# Patient Record
Sex: FEMALE | Race: BLACK OR AFRICAN AMERICAN | URBAN - METROPOLITAN AREA
[De-identification: names, ages, dates, MRNs, and addresses within clinical notes are randomized per-mention and may not be internally consistent; named-entity substitution may affect disease eponyms.]

---

## 2018-02-08 ENCOUNTER — EMERGENCY (EMERGENCY)
Facility: HOSPITAL | Age: 38
LOS: 1 days | Discharge: HOME | End: 2018-02-08
Admitting: EMERGENCY MEDICINE
Payer: MEDICARE

## 2018-02-08 VITALS
WEIGHT: 119.93 LBS | OXYGEN SATURATION: 100 % | SYSTOLIC BLOOD PRESSURE: 96 MMHG | RESPIRATION RATE: 16 BRPM | DIASTOLIC BLOOD PRESSURE: 67 MMHG | TEMPERATURE: 98 F | HEART RATE: 98 BPM

## 2018-02-08 DIAGNOSIS — N39.0 URINARY TRACT INFECTION, SITE NOT SPECIFIED: ICD-10-CM

## 2018-02-08 DIAGNOSIS — F17.200 NICOTINE DEPENDENCE, UNSPECIFIED, UNCOMPLICATED: ICD-10-CM

## 2018-02-08 DIAGNOSIS — R10.12 LEFT UPPER QUADRANT PAIN: ICD-10-CM

## 2018-02-08 DIAGNOSIS — E11.9 TYPE 2 DIABETES MELLITUS WITHOUT COMPLICATIONS: ICD-10-CM

## 2018-02-08 LAB
ALBUMIN SERPL ELPH-MCNC: 3.3 G/DL — LOW (ref 3.4–5)
ALBUMIN SERPL ELPH-MCNC: 3.4 G/DL — SIGNIFICANT CHANGE UP (ref 3.4–5)
ALP SERPL-CCNC: 85 U/L — SIGNIFICANT CHANGE UP (ref 40–120)
ALP SERPL-CCNC: 87 U/L — SIGNIFICANT CHANGE UP (ref 40–120)
ALT FLD-CCNC: 19 U/L — SIGNIFICANT CHANGE UP (ref 12–42)
ALT FLD-CCNC: 21 U/L — SIGNIFICANT CHANGE UP (ref 12–42)
ANION GAP SERPL CALC-SCNC: 10 MMOL/L — SIGNIFICANT CHANGE UP (ref 9–16)
ANION GAP SERPL CALC-SCNC: 9 MMOL/L — SIGNIFICANT CHANGE UP (ref 9–16)
APPEARANCE UR: CLEAR — SIGNIFICANT CHANGE UP
AST SERPL-CCNC: 21 U/L — SIGNIFICANT CHANGE UP (ref 15–37)
AST SERPL-CCNC: 27 U/L — SIGNIFICANT CHANGE UP (ref 15–37)
BILIRUB SERPL-MCNC: 0.1 MG/DL — LOW (ref 0.2–1.2)
BILIRUB SERPL-MCNC: 0.2 MG/DL — SIGNIFICANT CHANGE UP (ref 0.2–1.2)
BILIRUB UR-MCNC: NEGATIVE — SIGNIFICANT CHANGE UP
BUN SERPL-MCNC: 50 MG/DL — HIGH (ref 7–23)
BUN SERPL-MCNC: 50 MG/DL — HIGH (ref 7–23)
CALCIUM SERPL-MCNC: 9.4 MG/DL — SIGNIFICANT CHANGE UP (ref 8.5–10.5)
CALCIUM SERPL-MCNC: 9.4 MG/DL — SIGNIFICANT CHANGE UP (ref 8.5–10.5)
CHLORIDE SERPL-SCNC: 106 MMOL/L — SIGNIFICANT CHANGE UP (ref 96–108)
CHLORIDE SERPL-SCNC: 107 MMOL/L — SIGNIFICANT CHANGE UP (ref 96–108)
CO2 SERPL-SCNC: 24 MMOL/L — SIGNIFICANT CHANGE UP (ref 22–31)
CO2 SERPL-SCNC: 25 MMOL/L — SIGNIFICANT CHANGE UP (ref 22–31)
COLOR SPEC: YELLOW — SIGNIFICANT CHANGE UP
CREAT SERPL-MCNC: 1.68 MG/DL — HIGH (ref 0.5–1.3)
CREAT SERPL-MCNC: 1.7 MG/DL — HIGH (ref 0.5–1.3)
DIFF PNL FLD: NEGATIVE — SIGNIFICANT CHANGE UP
GLUCOSE SERPL-MCNC: 60 MG/DL — LOW (ref 70–99)
GLUCOSE SERPL-MCNC: 94 MG/DL — SIGNIFICANT CHANGE UP (ref 70–99)
GLUCOSE UR QL: NEGATIVE — SIGNIFICANT CHANGE UP
HCG UR QL: NEGATIVE — SIGNIFICANT CHANGE UP
HCT VFR BLD CALC: 31.9 % — LOW (ref 34.5–45)
HGB BLD-MCNC: 9.9 G/DL — LOW (ref 11.5–15.5)
KETONES UR-MCNC: NEGATIVE — SIGNIFICANT CHANGE UP
LEUKOCYTE ESTERASE UR-ACNC: (no result)
LIDOCAIN IGE QN: 114 U/L — SIGNIFICANT CHANGE UP (ref 73–393)
MCHC RBC-ENTMCNC: 22.1 PG — LOW (ref 27–34)
MCHC RBC-ENTMCNC: 31 G/DL — LOW (ref 32–36)
MCV RBC AUTO: 71.4 FL — LOW (ref 80–100)
NITRITE UR-MCNC: POSITIVE
PH UR: 7 — SIGNIFICANT CHANGE UP (ref 5–8)
PLATELET # BLD AUTO: 373 K/UL — SIGNIFICANT CHANGE UP (ref 150–400)
POTASSIUM SERPL-MCNC: 5.3 MMOL/L — SIGNIFICANT CHANGE UP (ref 3.5–5.3)
POTASSIUM SERPL-MCNC: 6 MMOL/L — HIGH (ref 3.5–5.3)
POTASSIUM SERPL-SCNC: 5.3 MMOL/L — SIGNIFICANT CHANGE UP (ref 3.5–5.3)
POTASSIUM SERPL-SCNC: 6 MMOL/L — HIGH (ref 3.5–5.3)
PROT SERPL-MCNC: 8.1 G/DL — SIGNIFICANT CHANGE UP (ref 6.4–8.2)
PROT SERPL-MCNC: 8.3 G/DL — HIGH (ref 6.4–8.2)
PROT UR-MCNC: NEGATIVE MG/DL — SIGNIFICANT CHANGE UP
RBC # BLD: 4.47 M/UL — SIGNIFICANT CHANGE UP (ref 3.8–5.2)
RBC # FLD: 15.6 % — SIGNIFICANT CHANGE UP (ref 10.3–16.9)
SODIUM SERPL-SCNC: 140 MMOL/L — SIGNIFICANT CHANGE UP (ref 132–145)
SODIUM SERPL-SCNC: 141 MMOL/L — SIGNIFICANT CHANGE UP (ref 132–145)
SP GR SPEC: 1.01 — SIGNIFICANT CHANGE UP (ref 1–1.03)
UROBILINOGEN FLD QL: 0.2 E.U./DL — SIGNIFICANT CHANGE UP
WBC # BLD: 6.4 K/UL — SIGNIFICANT CHANGE UP (ref 3.8–10.5)
WBC # FLD AUTO: 6.4 K/UL — SIGNIFICANT CHANGE UP (ref 3.8–10.5)

## 2018-02-08 PROCEDURE — 93010 ELECTROCARDIOGRAM REPORT: CPT

## 2018-02-08 PROCEDURE — 99284 EMERGENCY DEPT VISIT MOD MDM: CPT | Mod: 25

## 2018-02-08 RX ORDER — CEFUROXIME AXETIL 250 MG
1 TABLET ORAL
Qty: 13 | Refills: 0 | OUTPATIENT
Start: 2018-02-08 | End: 2018-02-14

## 2018-02-08 RX ORDER — METOCLOPRAMIDE HCL 10 MG
10 TABLET ORAL ONCE
Qty: 0 | Refills: 0 | Status: COMPLETED | OUTPATIENT
Start: 2018-02-08 | End: 2018-02-08

## 2018-02-08 RX ORDER — METOCLOPRAMIDE HCL 10 MG
10 TABLET ORAL ONCE
Qty: 0 | Refills: 0 | Status: DISCONTINUED | OUTPATIENT
Start: 2018-02-08 | End: 2018-02-08

## 2018-02-08 RX ORDER — SODIUM CHLORIDE 9 MG/ML
3 INJECTION INTRAMUSCULAR; INTRAVENOUS; SUBCUTANEOUS ONCE
Qty: 0 | Refills: 0 | Status: COMPLETED | OUTPATIENT
Start: 2018-02-08 | End: 2018-02-08

## 2018-02-08 RX ORDER — ONDANSETRON 8 MG/1
4 TABLET, FILM COATED ORAL ONCE
Qty: 0 | Refills: 0 | Status: DISCONTINUED | OUTPATIENT
Start: 2018-02-08 | End: 2018-02-08

## 2018-02-08 RX ORDER — SODIUM CHLORIDE 9 MG/ML
1000 INJECTION INTRAMUSCULAR; INTRAVENOUS; SUBCUTANEOUS ONCE
Qty: 0 | Refills: 0 | Status: COMPLETED | OUTPATIENT
Start: 2018-02-08 | End: 2018-02-08

## 2018-02-08 RX ORDER — CEFUROXIME AXETIL 250 MG
500 TABLET ORAL ONCE
Qty: 0 | Refills: 0 | Status: COMPLETED | OUTPATIENT
Start: 2018-02-08 | End: 2018-02-08

## 2018-02-08 RX ORDER — KETOROLAC TROMETHAMINE 30 MG/ML
30 SYRINGE (ML) INJECTION ONCE
Qty: 0 | Refills: 0 | Status: DISCONTINUED | OUTPATIENT
Start: 2018-02-08 | End: 2018-02-08

## 2018-02-08 RX ADMIN — Medication 500 MILLIGRAM(S): at 12:42

## 2018-02-08 RX ADMIN — Medication 10 MILLIGRAM(S): at 12:43

## 2018-02-08 RX ADMIN — SODIUM CHLORIDE 3 MILLILITER(S): 9 INJECTION INTRAMUSCULAR; INTRAVENOUS; SUBCUTANEOUS at 10:46

## 2018-02-08 RX ADMIN — SODIUM CHLORIDE 1000 MILLILITER(S): 9 INJECTION INTRAMUSCULAR; INTRAVENOUS; SUBCUTANEOUS at 12:42

## 2018-02-08 RX ADMIN — Medication 30 MILLIGRAM(S): at 12:42

## 2018-02-08 NOTE — ED PROVIDER NOTE - OBJECTIVE STATEMENT
36 y/o F with PMH of NIDDM, intranasal heroin use presents to ED c/o LUQ pain today with associated n/v.  Pt states she cannot tolerate PO.  She is currently in rehab but states she normally uses 10-13 bags of heroin per day.  Pt last used 1 bag yesterday.  She denies fevers/chills, blood in emesis, dysuria, hematuria, past abd surgeries.  Pt denies recent alcohol.      Of note, pt states she has not been getting Methadone or Suboxone due to insurance issues at rehab center?

## 2018-02-08 NOTE — SBIRT NOTE. - NSSBIRTSERVICES_GEN_A_ED_FT
Provided SBIRT services: Full screen positive. Referral to Treatment Performed. Screening results were  reviewed with the patient and patient was provided information about healthy guidelines and potential negative consequences associated with level of risk. Motivation and readiness to reduce or stop use was discussed and goals and activities to make changes were suggested/offered.    Referral for complete assessment and level of care determination at a certified treatment facility was  completed by contacting [facility] treatment facility via phone [phone]..  HC was informed that there are no beds available at this time.   Patient can walk in on her own from 9 am to 12pm any time with photo id and social security card to start the enrollment process.  [address] [facility] [id number]    Audit Score: 0  DAST Score: 7  Duration = # 20 Minutes

## 2018-02-08 NOTE — ED ADULT NURSE REASSESSMENT NOTE - NS ED NURSE REASSESS COMMENT FT1
patient refused toradol/reglan, "I don't want that shit get out of here. " Patient put on coat and exited room. NP aware.

## 2018-02-08 NOTE — ED PROVIDER NOTE - MEDICAL DECISION MAKING DETAILS
36 y/o F presents to ED c/o LUQ pain with associated n/v.  Pt well appearing.  Abd soft, non-tender, no CVAT.  Initial potassium of 6, no peaked twaves on EKG.  Repeat potassium 5.3.  Pt hydrated with IVFS, nausea treated.  Pt tolerating PO.  Pt requesting Methadone.  Symptoms likely associated with opiate withdrawal.  UA positive with nitrates and contaminated specimen.  Given lack of ability to reach pt, will treat conservatively with Ceftin Rx.  Pt discharged back to rehab facility with return precautions.

## 2018-02-13 ENCOUNTER — HOSPITAL ENCOUNTER (INPATIENT)
Dept: HOSPITAL 74 - YASAS | Age: 38
LOS: 2 days | Discharge: ANOTHER HOSPITAL | DRG: 897 | End: 2018-02-15
Attending: INTERNAL MEDICINE | Admitting: INTERNAL MEDICINE
Payer: COMMERCIAL

## 2018-02-13 VITALS — BODY MASS INDEX: 22.2 KG/M2

## 2018-02-13 DIAGNOSIS — F10.230: ICD-10-CM

## 2018-02-13 DIAGNOSIS — F19.24: ICD-10-CM

## 2018-02-13 DIAGNOSIS — F13.10: Primary | ICD-10-CM

## 2018-02-13 DIAGNOSIS — F17.210: ICD-10-CM

## 2018-02-13 DIAGNOSIS — F12.20: ICD-10-CM

## 2018-02-13 DIAGNOSIS — F14.20: ICD-10-CM

## 2018-02-13 PROCEDURE — HZ2ZZZZ DETOXIFICATION SERVICES FOR SUBSTANCE ABUSE TREATMENT: ICD-10-PCS | Performed by: INTERNAL MEDICINE

## 2018-02-13 NOTE — HP
CIWA Score





- CIWA Score


Nausea/Vomiting: 3


Muscle Tremors: 4-Moderate,w/Arms Extend


Anxiety: 4-Mod. Anxious/Guarded


Agitation: 4-Moderately Restless


Paroxysmal Sweats: 2


Orientation: 0-Oriented


Tacttile Disturbances: 0-None


Auditory Disturbances: 0-None


Visual Disturbances: 0-None


Headache: 0-None Present


CIWA-Ar Total Score: 17





Admission ROS S





- HPI


Chief Complaint: 





Alcohol withdrawal symptoms


Allergies/Adverse Reactions: 


 Allergies











Allergy/AdvReac Type Severity Reaction Status Date / Time


 


No Known Allergies Allergy   Verified 02/13/18 20:58














- Ebola screening


Have you traveled outside of the country in the last 21 days: No (N)


Have you had contact with anyone from an Ebola affected area: No


Have you been sick,other than usual withdrawal symptoms: No


Do you have a fever: No





Admission Physical Exam [facility]





- Vital Signs


Vital Signs: 


 Vital Signs - 24 hr











  02/13/18





  15:09


 


Temperature 97 F L


 


Pulse Rate 91 H


 


Respiratory 20





Rate 


 


Blood Pressure 111/67














BHS Breath Alcohol Content


Breath Alcohol Content: 0





Urine Pregancy Test





- Result


Urine Pregnancy Test Results: Negative- NO Line Present





Urine Drug Screen





- Results


Drug Screen Negative: No


Urine Drug Screen Results: PATIENCE-Cocaine, BZO-Benzodiazepines, MTD-Methadone

## 2018-02-13 NOTE — HP
CIWA Score





- CIWA Score


Nausea/Vomiting: 3 (VOMITING X 2)


Muscle Tremors: 4-Moderate,w/Arms Extend


Anxiety: 4-Mod. Anxious/Guarded


Agitation: 3


Paroxysmal Sweats: No Perspiration


Orientation: 0-Oriented


Tacttile Disturbances: 0-None


Auditory Disturbances: 0-None


Visual Disturbances: 0-None


Headache: 0-None Present


CIWA-Ar Total Score: 14





Admission ROS S





- HPI


Chief Complaint: 


Alcohol withdrawal symptoms


Allergies/Adverse Reactions: 


 Allergies











Allergy/AdvReac Type Severity Reaction Status Date / Time


 


No Known Allergies Allergy   Verified 02/13/18 20:58











History of Present Illness: 





37 years female guera a long history of alcohol dependence is admitted to detox. 

Patient has been in previous detox at [facility] and reports 

insignificant period of sobriety. Patient has past medical medical history of 

DM type 2, anemia and depression. Denies suicidal ideation at this time. This 

is her first admission to [facility]. Her urine was positive for benzodiazepine and 

cocaine but she asserts that her cocaine may have been tainted and that she was 

recently hospitalized and medicated with methadone.


Exam Limitations: No Limitations





- Ebola screening


Have you traveled outside of the country in the last 21 days: No (N)


Have you had contact with anyone from an Ebola affected area: No


Have you been sick,other than usual withdrawal symptoms: No


Do you have a fever: No





- Review of Systems


Constitutional: Chills, Loss of Appetite, Malaise, Night Sweats, Changes in 

sleep


EENT: reports: Nose Congestion, Sinus Pressure


Respiratory: reports: No Symptoms reported


Cardiac: reports: No Symptoms Reported


GI: reports: Poor Appetite, Poor Fluid Intake, Vomiting, Abdominal cramping


: reports: No Symptoms Reported


Musculoskeletal: reports: Back Pain, Muscle Pain, Muscle Weakness


Integumentary: reports: Flushing


Neuro: reports: Tingling, Tremors


Endocrine: reports: No Symptoms Reported


Hematology: reports: Anemia


Psychiatric: reports: Orientated x3, Agitated, Anxious


Other Systems: Reviewed and Negative





Patient History





- Patient Medical History


Hx Anemia: Yes


Hx Asthma: No


Hx Chronic Obstructive Pulmonary Disease (COPD): No


Hx Cardiac Disorders: No


Hx Congestive Heart Failure: No


Hx Hypertension: No


Hx Hypercholesterolemia: No


HX Cerebrovascular Accident: No


Hx Seizures: No


Hx Diabetes: No


Hx Gastrointestinal Disorders: No


Hx Liver Disease: No


Hx Genitourinary Disorders: No


Hx Sexually Transmitted Disorders: No


Hx Human Immunodeficiency Virus (HIV): No (Negative January 2018)


Hx Hepatitis C: No


Hx Depression: Yes


Hx Suicide Attempt: No (Denies suicidal ideation at this time)


Hx Bipolar Disorder: No


Hx Schizophrenia: No





- Patient Surgical History


Past Surgical History: No





- PPD History


Previous Implant?: Yes


Documented Results: Negative w/o proof


Implanted On Prior R Admission?: No


PPD to be Administered?: Yes





- Reproductive History


Patient is a Female of Child Bearing Age (11 -55 yrs old): Yes


LMP comment: 4 years ago. 


Patient Pregnant: No





- Smoking Cessation


Smoking history: Current every day smoker


Have you smoked in the past 12 months: Yes


Aproximately how many cigarettes per day: 10


Hx Chewing Tobacco Use: No


Initiated information on smoking cessation: Yes


'Breaking Loose' booklet given: 02/13/18





- Substance & Tx. History


Hx Alcohol Use: Yes


Hx Substance Use: Yes


Substance Use Type: Cocaine


Hx Substance Use Treatment: Yes ([facility])





- Substances Abused


  ** Alcohol


Route: Oral (br)


Frequency: Daily


Amount used: SARA - 2 PINTS


Age of first use: 15


Date of Last Use: 02/12/18





  ** Cocaine


Route: Smoking


Frequency: Daily


Amount used: $50


Age of first use: 37


Date of Last Use: 02/12/18





Family Disease History





- Family Disease History


Family History: Denies





Admission Physical Exam BHS





- Vital Signs


Vital Signs: 


 Vital Signs - 24 hr











  02/13/18





  15:09


 


Temperature 97 F L


 


Pulse Rate 91 H


 


Respiratory 20





Rate 


 


Blood Pressure 111/67














- Physical


General Appearance: Yes: Moderate Distress, Tremorous, Irritable, Sweating, 

Anxious


HEENTM: Yes: EOMI, Normal ENT Inspection, Normal Voice, RIGO, Other (NO UPPER 

AND MISSING LOWER TEETH)


Respiratory: Yes: Lungs Clear, Normal Breath Sounds, No Respiratory Distress


Neck: Yes: Supple


Breast: Yes: Breast Exam Deferred


Cardiology: Yes: Regular Rhythm, Regular Rate, S1, S2


Abdominal: Yes: Normal Bowel Sounds, Soft


Genitourinary: Yes: Within Normal Limits


Back: Yes: Normal Inspection


Musculoskeletal: Yes: Back pain, Muscle Pain, Muscle weakness


Extremities: Yes: Tremors


Neurological: Yes: Alert, Normal Mood/Affect


Integumentary: Yes: Dry


Lymphatic: Yes: Within Normal Limits





- Diagnostic


(1) Alcohol dependence with uncomplicated withdrawal


Current Visit: Yes   Status: Chronic   





(2) Cocaine dependence, uncomplicated


Current Visit: Yes   Status: Chronic   





(3) Anemia


Current Visit: Yes   Status: Chronic   





(4) Diabetes


Current Visit: Yes   Status: Chronic   





(5) Nicotine dependence


Current Visit: Yes   Status: Chronic   





Cleared for Admission [facility]





- Detox or Rehab


[facility] Level of Care: Medically Managed


Detox Regimen/Protocol: Librium





BHS Breath Alcohol Content


Breath Alcohol Content: 0





Urine Pregancy Test





- Result


Urine Pregnancy Test Results: Negative- NO Line Present





Urine Drug Screen





- Results


Drug Screen Negative: No


Urine Drug Screen Results: PATIENCE-Cocaine, BZO-Benzodiazepines, MTD-Methadone

## 2018-02-14 LAB
ALBUMIN SERPL-MCNC: 2.8 G/DL (ref 3.4–5)
ALP SERPL-CCNC: 84 U/L (ref 45–117)
ALT SERPL-CCNC: 30 U/L (ref 12–78)
ANION GAP SERPL CALC-SCNC: 7 MMOL/L (ref 8–16)
APPEARANCE UR: (no result)
AST SERPL-CCNC: 23 U/L (ref 15–37)
BILIRUB SERPL-MCNC: 0.3 MG/DL (ref 0.2–1)
BILIRUB UR STRIP.AUTO-MCNC: NEGATIVE MG/DL
BUN SERPL-MCNC: 31 MG/DL (ref 7–18)
CALCIUM SERPL-MCNC: 8.7 MG/DL (ref 8.5–10.1)
CHLORIDE SERPL-SCNC: 107 MMOL/L (ref 98–107)
CO2 SERPL-SCNC: 27 MMOL/L (ref 21–32)
COLOR UR: YELLOW
CREAT SERPL-MCNC: 1.4 MG/DL (ref 0.55–1.02)
DEPRECATED RDW RBC AUTO: 15.8 % (ref 11.6–15.6)
EPITH CASTS URNS QL MICRO: (no result) /HPF
GLUCOSE SERPL-MCNC: 146 MG/DL (ref 74–106)
HCT VFR BLD CALC: 29 % (ref 32.4–45.2)
HGB BLD-MCNC: 9 GM/DL (ref 10.7–15.3)
KETONES UR QL STRIP: NEGATIVE
LEUKOCYTE ESTERASE UR QL STRIP.AUTO: (no result)
MCH RBC QN AUTO: 22.1 PG (ref 25.7–33.7)
MCHC RBC AUTO-ENTMCNC: 31 G/DL (ref 32–36)
MCV RBC: 71.2 FL (ref 80–96)
NITRITE UR QL STRIP: NEGATIVE
PH UR: 6 [PH] (ref 5–8)
PLATELET # BLD AUTO: 352 K/MM3 (ref 134–434)
PMV BLD: 7.4 FL (ref 7.5–11.1)
POTASSIUM SERPLBLD-SCNC: 4.9 MMOL/L (ref 3.5–5.1)
PROT SERPL-MCNC: 6.4 G/DL (ref 6.4–8.2)
PROT UR QL STRIP: NEGATIVE
PROT UR QL STRIP: NEGATIVE
RBC # BLD AUTO: 4.08 M/MM3 (ref 3.6–5.2)
RBC # UR STRIP: NEGATIVE /UL
SODIUM SERPL-SCNC: 141 MMOL/L (ref 136–145)
SP GR UR: 1.02 (ref 1–1.03)
UROBILINOGEN UR STRIP-MCNC: NEGATIVE MG/DL (ref 0.2–1)
WBC # BLD AUTO: 6.1 K/MM3 (ref 4–10)

## 2018-02-14 RX ADMIN — Medication SCH TAB: at 10:46

## 2018-02-14 RX ADMIN — METHOCARBAMOL SCH MG: 500 TABLET ORAL at 14:31

## 2018-02-14 RX ADMIN — IBUPROFEN PRN MG: 400 TABLET, FILM COATED ORAL at 06:23

## 2018-02-14 RX ADMIN — METHOCARBAMOL SCH MG: 500 TABLET ORAL at 22:40

## 2018-02-14 NOTE — CONSULT
[facility] Psychiatric Consult





- Data


Date of interview: 02/14/18


Identifying data: This is 37 years old single, mother of three, homeless, on 

SSD female with a long history of alcohol dependence admitted for detox. 

Patient has been in previous detox at [facility] and reports 

insignificant period of sobriety.


Substance Abuse History: Drug Screen Negative: No.  Urine Drug Screen Results: 

PATIENCE-Cocaine, BZO-Benzodiazepines, MTD-Methadone.  Smoking Cessation.  Smoking 

history: Current every day smoker.  Have you smoked in the past 12 months: Yes.

  Aproximately how many cigarettes per day: 10.  Hx Chewing Tobacco Use: No.  

Initiated information on smoking cessation: Yes.  'Breaking Loose' booklet given

: 02/13/18.  - Substance & Tx. History.  Hx Alcohol Use: Yes.  Hx Substance Use

: Yes.  Substance Use Type: Cocaine.  Hx Substance Use Treatment: Yes ([facility]).  - Substances Abused.  ** Alcohol.  Route: Oral (br).  

Frequency: Daily.  Amount used: SARA - 2 PINTS.  Age of first use: 15.  Date 

of Last Use: 02/12/18.  ** Cocaine.  Route: Smoking.  Frequency: Daily.  Amount 

used: $50.  Age of first use: 37.  Date of Last Use: 02/12/18


Medical History: DM-2, Anemia history, MMTP history 20mg per day.


Psychiatric History: Patient reports no psychiatric hospitalization history, no 

medications taking prior to admission.


Physical/Sexual Abuse/Trauma History: Denies


Additional Comment: Observation.  Detox Unit Care Protocol





Mental Status Exam





- Mental Status Exam


Alert and Oriented to: Person


Cognitive Function: Fair


Patient Appearance: Unkempt


Mood: Anxious


Affect: Labile


Patient Behavior: Talkative


Speech Pattern: Excessive


Voice Loudness: Mildly Loud


Thought Process: Circumstantial


Thought Disorder: Being Controlled


Hallucinations: Denies


Suicidal Ideation: Denies


Homicidal Ideation: Denies


Insight/Judgement: Fair


Sleep: Difficulty falling asleep


Appetite: Weight loss


Muscle strength/Tone: Mild Hypotonicity


Gait/Station: Shuffling


Additional Comments: Observation.  Detox Unit Care Protocol





Psychiatric Findings





- Problem List (Axis 1, 2,3)


(1) Cannabis dependence


Current Visit: Yes   Status: Acute   





(2) Benzodiazepine abuse


Current Visit: Yes   Status: Acute   





(3) Drug-induced mood disorder


Current Visit: Yes   Status: Acute   





(4) Alcohol dependence with uncomplicated withdrawal


Current Visit: Yes   Status: Chronic   





(5) Cocaine dependence, uncomplicated


Current Visit: Yes   Status: Chronic   





(6) Nicotine dependence


Current Visit: Yes   Status: Chronic   





- Initial Treatment Plan


Initial Treatment Plan: Observation.  Detox Unit Care Protocol

## 2018-02-14 NOTE — EKG
Test Reason : 

Blood Pressure : ***/*** mmHG

Vent. Rate : 081 BPM     Atrial Rate : 081 BPM

   P-R Int : 138 ms          QRS Dur : 082 ms

    QT Int : 376 ms       P-R-T Axes : 000 004 036 degrees

   QTc Int : 436 ms

 

NORMAL SINUS RHYTHM

NORMAL ECG

NO PREVIOUS ECGS AVAILABLE

Confirmed by TIFFANY DAVIDSON, MARINA ([id number]) on 2/14/2018 11:01:35 AM

 

Referred By:             Confirmed By:MARINA ALLEN MD

## 2018-02-14 NOTE — PN
[facility] CIWA





- CIWA Score


Nausea/Vomitin-Mild Nausea/No Vomiting


Muscle Tremors: 4-Moderate,w/Arms Extend


Anxiety: 3


Agitation: 3


Paroxysmal Sweats: 1-Minimal Palms Moist


Orientation: 0-Oriented


Tacttile Disturbances: 0-None


Auditory Disturbances: 0-None


Visual Disturbances: 0-None


Headache: 0-None Present


CIWA-Ar Total Score: 12





BHS COWS





- Scale


Resting Pulse: 1= MA 


Sweatin= Chills/Flushing


Restless Observation: 3= Extraneous Movement


Pupil Size: 1= Pupils >than Normal


Bone or Joint Aches: 2= Severe Diffuse Aches


Runny Nose/ Eye Tearin= Runny Nose/Eyes


GI Upset > 30mins: 1= Stomach Cramp


Tremor Observation of Outstretched Hands: 2= Slight Tremor Visible


Yawning Observation: 1= 1-2x During Session


Anxiety or Irritability: 2=Irritable/Anxious


Goose Flesh Skin: 3=Piloerection


COWS Score: 19





BHS Progress Note (SOAP)


Subjective: 





joint ache


muscle cramp


GI distress


sweat


tremor


anxiety


irritability


restlessness


agitation


patient reports using heroin 20 bags a day last use a week ago, went to [facility] detox, last dose methadone two days ago, 


Objective: 





18 10:22


 Vital Signs











Temperature  97.9 F   18 01:46


 


Pulse Rate  83   18 01:46


 


Respiratory Rate  18   18 03:30


 


Blood Pressure  111/65   18 01:46


 


O2 Sat by Pulse Oximetry (%)      








 Laboratory Last Values











WBC  6.1 K/mm3 (4.0-10.0)   18  07:30    


 


RBC  4.08 M/mm3 (3.60-5.2)   18  07:30    


 


Hgb  9.0 GM/dL (10.7-15.3)  L  18  07:30    


 


Hct  29.0 % (32.4-45.2)  L  18  07:30    


 


MCV  71.2 fl (80-96)  L  18  07:30    


 


MCH  22.1 pg (25.7-33.7)  L  18  07:30    


 


MCHC  31.0 g/dl (32.0-36.0)  L  18  07:30    


 


RDW  15.8 % (11.6-15.6)  H  18  07:30    


 


Plt Count  352 K/MM3 (134-434)   18  07:30    


 


MPV  7.4 fl (7.5-11.1)  L  18  07:30    


 


POC Glucometer  130 UNITS ()   18  06:09    


 


Urine Color  Yellow   18  23:10    


 


Urine Appearance  Cloudy   18  23:10    


 


Urine pH  6.0  (5.0-8.0)   18  23:10    


 


Ur Specific Gravity  1.018  (1.001-1.035)   18  23:10    


 


Urine Protein  Negative  (NEGATIVE)   18  23:10    


 


Urine Glucose (UA)  Negative  (NEGATIVE)   18  23:10    


 


Urine Ketones  Negative  (NEGATIVE)   18  23:10    


 


Urine Blood  Negative  (NEGATIVE)   18  23:10    


 


Urine Nitrite  Negative  (NEGATIVE)   18  23:10    


 


Urine Bilirubin  Negative  (NEGATIVE)   18  23:10    


 


Urine Urobilinogen  Negative mg/dL (0.2-1.0)   18  23:10    


 


Ur Leukocyte Esterase  3+  (NEGATIVE)  H  18  23:10    


 


Urine WBC (Auto)  13 /hpf (3-5)   18  23:10    


 


Urine RBC (Auto)  8 /hpf (0-3)   18  23:10    


 


Ur Epithelial Cells  Moderate /HPF (FEW)   18  23:10    








lab noted


repeat ua


18 10:24


cows = 19


Assessment: 





18 10:25


withdrawal sx


Plan: 





continue detox


adds methadone detox regimen


old track marks noted patient does not use needle recent years

## 2018-02-14 NOTE — PN
BHS Progress Note (SOAP)


Subjective: 


" I have back pain and can not sleep and I want my methadone increase"


Objective: 





02/14/18 22:09


 Last Vital Signs











Temp Pulse Resp BP Pulse Ox


 


 98.4 F   108 H  18   116/78    


 


 02/14/18 18:51  02/14/18 18:51  02/14/18 18:51  02/14/18 18:51   








 








               Laboratory Last Values











WBC  6.1 K/mm3 (4.0-10.0)   02/14/18  07:30    


 


RBC  4.08 M/mm3 (3.60-5.2)   02/14/18  07:30    


 


Hgb  9.0 GM/dL (10.7-15.3)  L  02/14/18  07:30    


 


Hct  29.0 % (32.4-45.2)  L  02/14/18  07:30    


 


MCV  71.2 fl (80-96)  L  02/14/18  07:30    


 


MCH  22.1 pg (25.7-33.7)  L  02/14/18  07:30    


 


MCHC  31.0 g/dl (32.0-36.0)  L  02/14/18  07:30    


 


RDW  15.8 % (11.6-15.6)  H  02/14/18  07:30    


 


Plt Count  352 K/MM3 (134-434)   02/14/18  07:30    


 


MPV  7.4 fl (7.5-11.1)  L  02/14/18  07:30    


 


Sodium  141 mmol/L (136-145)   02/14/18  07:30    


 


Potassium  4.9 mmol/L (3.5-5.1)   02/14/18  07:30    


 


Chloride  107 mmol/L ()   02/14/18  07:30    


 


Carbon Dioxide  27 mmol/L (21-32)   02/14/18  07:30    


 


Anion Gap  7  (8-16)  L  02/14/18  07:30    


 


BUN  31 mg/dL (7-18)  H  02/14/18  07:30    


 


Creatinine  1.4 mg/dL (0.55-1.02)  H  02/14/18  07:30    


 


Creat Clearance w eGFR  42.08  (>60)   02/14/18  07:30    


 


POC Glucometer  150 UNITS ()   02/14/18  16:28    


 


Random Glucose  146 mg/dL ()  H  02/14/18  07:30    


 


Calcium  8.7 mg/dL (8.5-10.1)   02/14/18  07:30    


 


Total Bilirubin  0.3 mg/dL (0.2-1.0)   02/14/18  07:30    


 


AST  23 U/L (15-37)   02/14/18  07:30    


 


ALT  30 U/L (12-78)   02/14/18  07:30    


 


Alkaline Phosphatase  84 U/L ()   02/14/18  07:30    


 


Total Protein  6.4 g/dl (6.4-8.2)   02/14/18  07:30    


 


Albumin  2.8 g/dl (3.4-5.0)  L  02/14/18  07:30    


 


Urine Color  Yellow   02/13/18  23:10    


 


Urine Appearance  Cloudy   02/13/18  23:10    


 


Urine pH  6.0  (5.0-8.0)   02/13/18  23:10    


 


Ur Specific Gravity  1.018  (1.001-1.035)   02/13/18  23:10    


 


Urine Protein  Negative  (NEGATIVE)   02/13/18  23:10    


 


Urine Glucose (UA)  Negative  (NEGATIVE)   02/13/18  23:10    


 


Urine Ketones  Negative  (NEGATIVE)   02/13/18  23:10    


 


Urine Blood  Negative  (NEGATIVE)   02/13/18  23:10    


 


Urine Nitrite  Negative  (NEGATIVE)   02/13/18  23:10    


 


Urine Bilirubin  Negative  (NEGATIVE)   02/13/18  23:10    


 


Urine Urobilinogen  Negative mg/dL (0.2-1.0)   02/13/18  23:10    


 


Ur Leukocyte Esterase  3+  (NEGATIVE)  H  02/13/18  23:10    


 


Urine WBC (Auto)  13 /hpf (3-5)   02/13/18  23:10    


 


Urine RBC (Auto)  8 /hpf (0-3)   02/13/18  23:10    


 


Ur Epithelial Cells  Moderate /HPF (FEW)   02/13/18  23:10    


 


RPR Titer  Nonreactive  (NONREACTIVE)   02/14/18  07:30    








AOx3, self directing, no acute distress


ambulating with out any abnormalities 


reports no urinary symptoms 





Assessment: 





02/14/18 22:11


lumbago 


withdrawal symptoms 


Plan: 





Continue detox 


increase fluids 


Continue scheduled methadone leny 


Continue Robaxin as schedule 


Lidocaine patch order 


Benadryl  qhs PRN

## 2018-02-15 ENCOUNTER — HOSPITAL ENCOUNTER (INPATIENT)
Dept: HOSPITAL 74 - YASAS | Age: 38
LOS: 3 days | Discharge: HOME | DRG: 897 | End: 2018-02-18
Attending: INTERNAL MEDICINE | Admitting: INTERNAL MEDICINE
Payer: COMMERCIAL

## 2018-02-15 VITALS — TEMPERATURE: 97.3 F | HEART RATE: 87 BPM | SYSTOLIC BLOOD PRESSURE: 121 MMHG | DIASTOLIC BLOOD PRESSURE: 78 MMHG

## 2018-02-15 VITALS — BODY MASS INDEX: 25 KG/M2

## 2018-02-15 DIAGNOSIS — T76.21XA: ICD-10-CM

## 2018-02-15 DIAGNOSIS — F14.20: ICD-10-CM

## 2018-02-15 DIAGNOSIS — F60.9: ICD-10-CM

## 2018-02-15 DIAGNOSIS — F10.230: ICD-10-CM

## 2018-02-15 DIAGNOSIS — F19.24: ICD-10-CM

## 2018-02-15 DIAGNOSIS — D64.9: ICD-10-CM

## 2018-02-15 DIAGNOSIS — F13.20: ICD-10-CM

## 2018-02-15 DIAGNOSIS — G47.00: ICD-10-CM

## 2018-02-15 DIAGNOSIS — F11.23: Primary | ICD-10-CM

## 2018-02-15 DIAGNOSIS — F17.210: ICD-10-CM

## 2018-02-15 DIAGNOSIS — E11.9: ICD-10-CM

## 2018-02-15 LAB
APPEARANCE UR: CLEAR
BILIRUB UR STRIP.AUTO-MCNC: NEGATIVE MG/DL
COLOR UR: (no result)
EPITH CASTS URNS QL MICRO: (no result) /HPF
KETONES UR QL STRIP: NEGATIVE
LEUKOCYTE ESTERASE UR QL STRIP.AUTO: (no result)
NITRITE UR QL STRIP: NEGATIVE
PH UR: 5 [PH] (ref 5–8)
PROT UR QL STRIP: NEGATIVE
PROT UR QL STRIP: NEGATIVE
RBC # UR STRIP: NEGATIVE /UL
SP GR UR: 1.01 (ref 1–1.03)
UROBILINOGEN UR STRIP-MCNC: NEGATIVE MG/DL (ref 0.2–1)

## 2018-02-15 PROCEDURE — HZ2ZZZZ DETOXIFICATION SERVICES FOR SUBSTANCE ABUSE TREATMENT: ICD-10-PCS | Performed by: INTERNAL MEDICINE

## 2018-02-15 RX ADMIN — Medication SCH TAB: at 10:42

## 2018-02-15 RX ADMIN — Medication SCH MG: at 22:54

## 2018-02-15 RX ADMIN — IBUPROFEN PRN MG: 400 TABLET, FILM COATED ORAL at 06:31

## 2018-02-15 RX ADMIN — METHOCARBAMOL SCH MG: 500 TABLET ORAL at 06:34

## 2018-02-15 NOTE — HP
COWS





- Scale


Resting Pulse: 0= [place] 80 or Below


Sweatin= Chills/Flushing


Restless Observation: 3= Extraneous Movement


Pupil Size: 1= Pupils >than Normal


Bone or Joint Aches: 2= Severe Diffuse Aches


Runny Nose/ Eye Tearin= Runny Nose/Eyes


GI Upset > 30mins: 1= Stomach Cramp


Tremor Observation: 0= None


Yawning Observation: 4= Several Times/Minute


Anxiety or Irritability: 2=Irritable/Anxious


Goose Flesh Skin: 0=Smooth Skin


COWS Score: 16





CIWA Score





- CIWA Score


Nausea/Vomitin-No Nausea/No Vomiting


Muscle Tremors: None


Anxiety: 5


Agitation: 3


Paroxysmal Sweats: 2


Orientation: 2-Disoriented Date<2 days


Tacttile Disturbances: 2-Mild Itch/Numbness/Burn


Auditory Disturbances: 1-Very Mild


Visual Disturbances: 2-Mild Sensitivity


Headache: 0-None Present


CIWA-Ar Total Score: 17





Admission ROS S





- HPI


Chief Complaint: 





withdrawal symptoms 


Allergies/Adverse Reactions: 


 Allergies











Allergy/AdvReac Type Severity Reaction Status Date / Time


 


No Known Allergies Allergy   Verified 02/15/18 19:12











History of Present Illness: 





38 yo female with hx of heroin, alcohol, and cocaine dependence is here seeking 

detox. Past medical hx of DMII and Anemia, currently reports no medicaitons 

prescribe. Patient had started detox at [facility] on 18, but was sent to [place] by Dr. Zarate earlier today for further evaluation after verbalizing 

that she wanted to kill herself and was cleared to return to complete detox. 

Currently denies suicidal / homicidal ideation.   Patient is unable to report 

any significant period of sobriety. Reports recent hospitalization at the 

beginning of the month ant [facility] but was unable to specify why she was 

hospitalize.  Patient was evaluated by AMARILIS Swanson NP (psych) and admission was 

discussed with Dr. Reyes. 


Exam Limitations: No Limitations





- Ebola screening


Have you traveled outside of the country in the last 21 days: No


Have you had contact with anyone from an Ebola affected area: No


Have you been sick,other than usual withdrawal symptoms: No


Do you have a fever: No





- Review of Systems


Constitutional: Chills, Malaise, Changes in sleep, Weakness


EENT: reports: Tearing, Nose Congestion


Respiratory: reports: No Symptoms reported


Cardiac: reports: Lightheadedness


GI: reports: Poor Fluid Intake, Abdominal cramping


: reports: No Symptoms Reported


Musculoskeletal: reports: Joint Pain, Muscle Pain


Integumentary: reports: Pruritus


Neuro: reports: Dizziness


Endocrine: reports: No Symptoms Reported


Hematology: reports: Anemia


Psychiatric: reports: Anxious, Depressed, other (AOXPP)


Other Systems: Reviewed and Negative





Patient History





- Patient Medical History


Hx Anemia: Yes


Hx Asthma: No


Hx Chronic Obstructive Pulmonary Disease (COPD): No


Hx Cancer: No


Hx Cardiac Disorders: No


Hx Congestive Heart Failure: No


Hx Hypertension: No


Hx Hypercholesterolemia: No


Hx Pacemaker: No


HX Cerebrovascular Accident: No


Hx Seizures: No


Hx Dementia: No


Hx Diabetes: No


Hx Gastrointestinal Disorders: No


Hx Liver Disease: No


Hx Genitourinary Disorders: No


Hx Sexually Transmitted Disorders: No


Hx Renal Disease (ESRD): No


Hx Thyroid Disease: No


Hx Human Immunodeficiency Virus (HIV): No (Negative 2018)


Hx Hepatitis C: No


Hx Depression: Yes


Hx Suicide Attempt: No (Denies suicidal ideation at this time)


Hx Bipolar Disorder: No


Hx Schizophrenia: No





- Patient Surgical History


Past Surgical History: No


Hx Neurologic Surgery: No


Hx Cataract Extraction: No


Hx Cardiac Surgery: No


Hx Lung Surgery: No


Hx Breast Surgery: No


Hx Breast Biopsy: No


Hx Abdominal Surgery: No


Hx Appendectomy: No


Hx Cholecystectomy: No


Hx Genitourinary Surgery: No


Hx  Section: No


Hx Orthopedic Surgery: No


Anesthesia Reaction: No





- PPD History


PPD to be Administered?: No





- Reproductive History


Patient is a Female of Child Bearing Age (11 -55 yrs old): No





- Smoking Cessation


Smoking history: Current every day smoker


Have you smoked in the past 12 months: Yes


Aproximately how many cigarettes per day: 10


Hx Chewing Tobacco Use: No


Initiated information on smoking cessation: Yes


'Breaking Loose' booklet given: 02/15/18





- Substance & Tx. History


Hx Alcohol Use: Yes


Hx Substance Use: Yes


Substance Use Type: Alcohol, Cocaine, Heroin


Hx Substance Use Treatment: Yes (Chandni Brandin 2018)





- Substances Abused


  ** Alcohol


Route: Oral


Frequency: Daily


Amount used: 1 pint  Judie 


Age of first use: 13


Date of Last Use: 18





  ** Heroin


Route: Inhalation


Frequency: Daily


Amount used: 2 bundles 


Age of first use: 37


Date of Last Use: 18





  ** Cocaine


Route: Inhalation


Frequency: Daily


Amount used: 2 bundles 


Age of first use: 37


Date of Last Use: 18





Family Disease History





- Family Disease History


Family Disease History: Diabetes: Mother (), Other: Father (alive, unkwn)

, Mother





Admission Physical Exam [facility]





- Vital Signs


Vital Signs: 


 Vital Signs - 24 hr











  02/15/18





  17:54


 


Temperature 96.9 F L


 


Pulse Rate 79


 


Respiratory 18





Rate 


 


Blood Pressure 102/73














- Physical


General Appearance: Yes: Disheveled, Irritable, Anxious


HEENTM: Yes: Within Normal Limits, EOMI, Hearing grossly Normal, Normal ENT 

Inspection, Normocephalic, Normal Voice, RIGO, Pharynx Normal, Tm's normal, 

Other (poor dentation)


Respiratory: Yes: Chest Non-Tender, Lungs Clear, Normal Breath Sounds, No 

Respiratory Distress, No Accessory Muscle Use


Neck: Yes: No masses,lesions,Nodules, Trachea in good position


Breast: Yes: Breast Exam Deferred


Cardiology: Yes: Regular Rhythm, Regular Rate, S1, S2


Abdominal: Yes: Normal Bowel Sounds, Non Tender, Soft, Protuberent


Genitourinary: Yes: Within Normal Limits


Back: Yes: Normal Inspection


Musculoskeletal: Yes: full range of Motion, Gait Steady


Extremities: Yes: Normal Capillary Refill, Normal Inspection, Normal Range of 

Motion, Non-Tender


Neurological: Yes: CNs II-XII NML intact, Fully Oriented, Alert, Motor Strength 

5/5, Depressed Affect, Other (irritable, anxious)


Integumentary: Yes: Normal Color, Dry, Warm


Lymphatic: Yes: Within Normal Limits





- Diagnostic


(1) Heroin dependence


Current Visit: Yes   Status: Chronic   





(2) Alcohol dependence with uncomplicated withdrawal


Current Visit: Yes   Status: Acute   





(3) Cocaine dependence, uncomplicated


Current Visit: Yes   Status: Chronic   





(4) Anemia


Current Visit: Yes   Status: Chronic   





(5) Nicotine dependence


Current Visit: Yes   Status: Chronic   





(6) Diabetes mellitus type 2, noninsulin dependent


Current Visit: Yes   Status: Chronic   





Cleared for Admission [facility]





- Detox or Rehab


[facility] Level of Care: Medically Managed


Detox Regimen/Protocol: Methadone/Librium





BHS Breath Alcohol Content


Breath Alcohol Content: 0





Urine Pregancy Test





- Result


Urine Pregnancy Test Results: Negative- NO Line Present





Urine Drug Screen





- Results


Drug Screen Negative: No


Urine Drug Screen Results: PATIENCE-Cocaine, BZO-Benzodiazepines, MTD-Methadone

## 2018-02-15 NOTE — HP
COWS





- Scale


Resting Pulse: 0= [place] 80 or Below


Sweatin= Chills/Flushing





CIWA Score





- CIWA Score


Nausea/Vomitin-Mild Nausea/No Vomiting


Muscle Tremors: 4-Moderate,w/Arms Extend


Anxiety: 3


Agitation: 3


Paroxysmal Sweats: 1-Minimal Palms Moist


Orientation: 0-Oriented


Tacttile Disturbances: 0-None


Auditory Disturbances: 0-None


Visual Disturbances: 0-None


Headache: 0-None Present


CIWA-Ar Total Score: 12





Admission ROS S





- HPI


Allergies/Adverse Reactions: 


 Allergies











Allergy/AdvReac Type Severity Reaction Status Date / Time


 


No Known Allergies Allergy   Verified 18 20:58














- Ebola screening


Have you traveled outside of the country in the last 21 days: No (N)


Have you had contact with anyone from an Ebola affected area: No


Have you been sick,other than usual withdrawal symptoms: No


Do you have a fever: No





Patient History





- Patient Medical History


Hx Anemia: Yes


Hx Asthma: No


Hx Chronic Obstructive Pulmonary Disease (COPD): No


Hx Cardiac Disorders: No


Hx Congestive Heart Failure: No


Hx Hypertension: No


Hx Hypercholesterolemia: No


HX Cerebrovascular Accident: No


Hx Seizures: No


Hx Diabetes: No


Hx Gastrointestinal Disorders: No


Hx Liver Disease: No


Hx Genitourinary Disorders: No


Hx Sexually Transmitted Disorders: No


Hx Renal Disease (ESRD): No


Hx Human Immunodeficiency Virus (HIV): No (Negative 2018)


Hx Hepatitis C: No


Hx Depression: Yes


Hx Suicide Attempt: No (Denies suicidal ideation at this time)


Hx Bipolar Disorder: No


Hx Schizophrenia: No





- Patient Surgical History


Past Surgical History: No


Hx Neurologic Surgery: No


Hx Cataract Extraction: No


Hx Cardiac Surgery: No


Hx Lung Surgery: No


Hx Breast Surgery: No


Hx Breast Biopsy: No


Hx Abdominal Surgery: No


Hx Appendectomy: No


Hx Cholecystectomy: No


Hx Genitourinary Surgery: No


Hx  Section: No


Hx Orthopedic Surgery: No


Anesthesia Reaction: No





- PPD History


Previous Implant?: Yes


Documented Results: Negative w/o proof


Implanted On Prior R Admission?: No





- Reproductive History


Patient Pregnant: No





- Smoking Cessation


Smoking history: Current every day smoker


Have you smoked in the past 12 months: Yes


Aproximately how many cigarettes per day: 10


Hx Chewing Tobacco Use: No


Initiated information on smoking cessation: Yes





- Substances Abused


  ** Alcohol


Route: Oral (br)


Frequency: Daily


Amount used: SARA - 2 PINTS


Age of first use: 15


Date of Last Use: 18





  ** Cocaine


Route: Smoking


Frequency: Daily


Amount used: $50


Age of first use: 37


Date of Last Use: 18





Admission Physical Exam [facility]





- Vital Signs


Vital Signs: 


 Vital Signs - 24 hr











  18





  10:38 15:03 18:51


 


Temperature 97.1 F L 97.7 F 98.4 F


 


Pulse Rate 89 92 H 108 H


 


Respiratory 16 20 18





Rate   


 


Blood Pressure 97/60 102/65 116/78














  02/14/18 02/15/18 02/15/18





  23:17 03:30 06:00


 


Temperature 98.1 F  96.3 F L


 


Pulse Rate 88  102 H


 


Respiratory 18 18 18





Rate   


 


Blood Pressure 102/68  145/87














BHS Breath Alcohol Content


Breath Alcohol Content: 0





Urine Pregancy Test





- Result


Urine Pregnancy Test Results: Negative- NO Line Present





Urine Drug Screen





- Results


Drug Screen Negative: No


Urine Drug Screen Results: PATIENCE-Cocaine, BZO-Benzodiazepines, MTD-Methadone

## 2018-02-15 NOTE — PN
Psychiatric Progress Note


Vital Signs: 


 Vital Signs











 Period  Temp  Pulse  Resp  BP Sys/Maya  Pulse Ox


 


 Last 24 Hr  96.3 F-98.4 F    16-20  /60-87  











Date of Session: 02/15/18


Chief Complaint:: Suicidal ideations, crying spelles, depressed mood, non 

compliance with mwd


HPI: As per nursing report patient depressed, not following orders, cruing and 

expressing suicidal ideation.  Haldol 1mg po stat.  Benadryl 25mg po satat.  

Patient refusing psychiatric medications


Current Medications: 


Active Medications











Generic Name Dose Route Start Last Admin





  Trade Name Freq  PRN Reason Stop Dose Admin


 


Acetaminophen  650 mg  02/13/18 22:06  





  Tylenol -  PO   





  Q4H PRN   





  FEVER   


 


Al Hydroxide/Mg Hydroxide  30 ml  02/13/18 22:06  





  Mylanta Oral Suspension -  PO   





  Q6H PRN   





  DYSPEPSIA   


 


Chlordiazepoxide HCl  25 mg  02/14/18 23:00  02/15/18 06:33





  Librium -  PO  02/15/18 17:01  25 mg





  [id number] SANDIE   Administration


 


Chlordiazepoxide HCl  15 mg  02/15/18 23:00  





  Librium -  PO  02/16/18 17:01  





  [id number] SANDIE   


 


Chlordiazepoxide HCl  25 mg  02/13/18 22:06  





  Librium -  PO  02/16/18 22:05  





  Q4H PRN   





  WITHDRAWAL(CONT SUBST)   


 


Chlordiazepoxide HCl  10 mg  02/16/18 23:00  





  Librium -  PO  02/17/18 17:01  





  [id number] SANDIE   


 


Diphenhydramine HCl  50 mg  02/14/18 18:45  02/14/18 22:40





  Benadryl -  PO   50 mg





  HS PRN   Administration





  INSOMNIA   


 


Diphenhydramine HCl  25 mg  02/15/18 10:31  





  Benadryl -  PO  02/15/18 10:32  





  NOW STA   


 


Eucalyptus/Menthol/Phenol/Sorbitol  1 each  02/13/18 22:06  





  Cepastat Lozenge -  MM   





  Q4H PRN   





  SORE THROAT   


 


Guaifenesin  10 ml  02/13/18 22:06  





  Robitussin Dm -  PO   





  Q6H PRN   





  COUGH   


 


Haloperidol  1 mg  02/15/18 10:28  





  Haldol -  PO  02/15/18 10:29  





  NOW STA   


 


Ibuprofen  400 mg  02/13/18 22:06  02/15/18 06:31





  Motrin -  PO   400 mg





  Q6H PRN   Administration





  PAIN LEVEL 4-6   


 


Loperamide HCl  4 mg  02/13/18 22:06  





  Imodium -  PO   





  Q6H PRN   





  DIARRHEA   


 


Magnesium Citrate  300 ml  02/13/18 22:06  





  Citroma -  PO   





  Q48H PRN   





  CONSTIPATION   


 


Magnesium Hydroxide  30 ml  02/13/18 22:06  





  Milk Of Magnesia -  PO   





  DAILY PRN   





  CONSTIPATION   


 


Methadone HCl  5 mg  02/18/18 06:00  





  Dolophine -  PO  02/18/18 06:01  





  ONCE@0600 ONE   


 


Methadone HCl  15 mg  02/16/18 10:00  





  Dolophine -  PO  02/16/18 10:01  





  ONCE ONE   


 


Methadone HCl  10 mg  02/17/18 10:00  





  Dolophine -  PO  02/17/18 10:01  





  ONCE ONE   


 


Methocarbamol  500 mg  02/14/18 14:00  02/15/18 06:34





  Robaxin -  PO   500 mg





  TID [facility]   Administration


 


Nicotine  14 mg  02/14/18 10:00  02/14/18 10:47





  Nicoderm Patch -  TD   14 mg





  DAILY SANDIE   Administration


 


Nicotine Polacrilex  2 mg  02/13/18 22:06  





  Nicorette Gum -  BC   





  Q2H PRN   





  NICOTINE REPLACEMENT RX   


 


Prenatal Multivit/Folic Acid/Iron  1 tab  02/14/18 10:00  02/14/18 10:46





  Prenatal Vitamins (Sjr) -  PO   1 tab





  DAILY SANDIE   Administration


 


Pseudoephedrine/Triprolidine  1 combo  02/13/18 22:06  





  Actifed -  PO   





  TID PRN   





  NASAL CONGESTION   


 


Thiamine HCl  100 mg  02/14/18 22:00  02/14/18 22:40





  Vitamin B1 -  PO   100 mg





  HS SANDIE   Administration











Medication(s) Change(s): Haldol 1mg po stat.  Benadryl 25mg po stat





Mental Status Exam





- Mental Status Exam


Alert and Oriented to: Person


Cognitive Function: Impaired


Patient Appearance: Unkempt


Mood: Depressed, Suspicious, Anxious


Affect: Inappropriate


Patient Behavior: Guarded, Suspicious, Distractible


Speech Pattern: Delayed


Voice Loudness: Moderately Soft/Quiet


Thought Process: Circumstantial


Thought Disorder: Delusional


Hallucinations: Auditory


Suicidal Ideation: Current


Homicidal Ideation: Denies


Insight/Judgement: Impaired


Sleep: Fair


Appetite: Weight loss


Muscle strength/Tone: Normal


Gait/Station: Normal


Additional Comments: Haldol 1mg po stat.  Benadryl 25mg po stst





Psychiatric Treatment Plan





- Problem List


(1) Cannabis dependence


Current Visit: Yes   





(2) Benzodiazepine abuse


Current Visit: Yes   





(3) Drug-induced mood disorder


Current Visit: Yes   





(4) Alcohol dependence with uncomplicated withdrawal


Current Visit: Yes   





(5) Cocaine dependence, uncomplicated


Current Visit: Yes   





(6) Nicotine dependence


Current Visit: Yes   


Initial treatment plan: Haldol 1mg po stat.  Benadryl 25mg po stat.  1:1 

observation.  Transfer to [facility] FOR SAFETY

## 2018-02-15 NOTE — CONSULT
[facility] Psychiatric Consult





- Data


Date of interview: 02/15/18


Admission source: [facility]


Identifying data: Pt. is a 37 year old single female, mother of three, 

unemployed and homeless. This is one of multiple admissions for patient. Pt. 

admitted to detox for alcohol and cocaine dependence.


Substance Abuse History: alcohol, cocaine and benzodiazepine


Medical History: Anemia


Psychiatric History: Pt. was transferred via 911 this morning from [facility] after 

expressing suicidal ideation by stating to staff, " I just feel like killing 

myself." Pt. taken to [facility] ER where patient was evaluated and 

discharged. Pt. reported to writer that her suicidal remarks was made due to 

her frustation with staff.  Pt. denies suicidal and homicial ideation.  Pt. 

denies h/o psychiatric hospitalization, outpatient care and suicide attempt.


Physical/Sexual Abuse/Trauma History: Denies.





Mental Status Exam





- Mental Status Exam


Alert and Oriented to: Time, Place, Person


Cognitive Function: Good


Patient Appearance: Unkempt


Mood: Withdrawn


Affect: Flat


Patient Behavior: Sedated, Fatigued


Speech Pattern: Delayed


Voice Loudness: Moderately Soft/Quiet


Thought Process: Goal Oriented


Thought Disorder: Not Present


Hallucinations: Denies


Suicidal Ideation: Denies


Homicidal Ideation: Denies


Insight/Judgement: Poor


Sleep: Poorly


Appetite: Fair


Muscle strength/Tone: Normal


Gait/Station: Normal





Psychiatric Findings





- Problem List (Axis 1, 2,3)


(1) Insomnia


Current Visit: Yes   Status: Acute   





(2) Alcohol dependence with uncomplicated withdrawal


Current Visit: Yes   Status: Chronic   





(3) Benzodiazepine dependence


Current Visit: Yes   Status: Acute   





(4) Cocaine dependence, uncomplicated


Current Visit: Yes   Status: Chronic   





- Initial Treatment Plan


Initial Treatment Plan: Psychoeducation provided. Detoxification in progress. 

Benadryl 50mg qhs ordered for insomnia + Vistaril 50mg q4h ordered for anxiety. 

Benefits and side effects discussed. Verbal consent given. Will continue to 

monitior.

## 2018-02-15 NOTE — PN
[facility] CIWA





- CIWA Score


Nausea/Vomitin-No Nausea/No Vomiting


Muscle Tremors: 3


Anxiety: 3


Agitation: 3


Paroxysmal Sweats: 1-Minimal Palms Moist


Orientation: 0-Oriented


Tacttile Disturbances: 0-None


Auditory Disturbances: 0-None


Visual Disturbances: 0-None


Headache: 0-None Present


CIWA-Ar Total Score: 10





BHS COWS





- Scale


Resting Pulse: 1= [place] 


Sweatin= Chills/Flushing


Restless Observation: 3= Extraneous Movement


Pupil Size: 0= Normal to Room Light


Bone or Joint Aches: 2= Severe Diffuse Aches


Runny Nose/ Eye Tearin= Runny Nose/Eyes


GI Upset > 30mins: 2= Nausea/Diarrhea


Tremor Observation of Outstretched Hands: 2= Slight Tremor Visible


Yawning Observation: 2= >3x During Session


Anxiety or Irritability: 2=Irritable/Anxious


Goose Flesh Skin: 0=Smooth Skin


COWS Score: 17





BHS Progress Note (SOAP)


Subjective: 





tearful


"I have hard time"


i want to kill myself


Objective: 





02/15/18 10:34


 Vital Signs











Temperature  96.3 F L  02/15/18 06:00


 


Pulse Rate  102 H  02/15/18 06:00


 


Respiratory Rate  18   02/15/18 06:00


 


Blood Pressure  145/87   02/15/18 06:00


 


O2 Sat by Pulse Oximetry (%)      








 Laboratory Last Values











WBC  6.1 K/mm3 (4.0-10.0)   18  07:30    


 


RBC  4.08 M/mm3 (3.60-5.2)   18  07:30    


 


Hgb  9.0 GM/dL (10.7-15.3)  L  18  07:30    


 


Hct  29.0 % (32.4-45.2)  L  18  07:30    


 


MCV  71.2 fl (80-96)  L  18  07:30    


 


MCH  22.1 pg (25.7-33.7)  L  18  07:30    


 


MCHC  31.0 g/dl (32.0-36.0)  L  18  07:30    


 


RDW  15.8 % (11.6-15.6)  H  18  07:30    


 


Plt Count  352 K/MM3 (134-434)   18  07:30    


 


MPV  7.4 fl (7.5-11.1)  L  18  07:30    


 


Sodium  141 mmol/L (136-145)   18  07:30    


 


Potassium  4.9 mmol/L (3.5-5.1)   18  07:30    


 


Chloride  107 mmol/L ()   18  07:30    


 


Carbon Dioxide  27 mmol/L (21-32)   18  07:30    


 


Anion Gap  7  (8-16)  L  18  07:30    


 


BUN  31 mg/dL (7-18)  H  18  07:30    


 


Creatinine  1.4 mg/dL (0.55-1.02)  H  18  07:30    


 


Creat Clearance w eGFR  42.08  (>60)   18  07:30    


 


POC Glucometer  150 UNITS ()   18  16:28    


 


Random Glucose  146 mg/dL ()  H  18  07:30    


 


Calcium  8.7 mg/dL (8.5-10.1)   18  07:30    


 


Total Bilirubin  0.3 mg/dL (0.2-1.0)   18  07:30    


 


AST  23 U/L (15-37)   18  07:30    


 


ALT  30 U/L (12-78)   18  07:30    


 


Alkaline Phosphatase  84 U/L ()   18  07:30    


 


Total Protein  6.4 g/dl (6.4-8.2)   18  07:30    


 


Albumin  2.8 g/dl (3.4-5.0)  L  18  07:30    


 


Urine Color  Yellow   18  23:10    


 


Urine Appearance  Cloudy   18  23:10    


 


Urine pH  6.0  (5.0-8.0)   18  23:10    


 


Ur Specific Gravity  1.018  (1.001-1.035)   18  23:10    


 


Urine Protein  Negative  (NEGATIVE)   18  23:10    


 


Urine Glucose (UA)  Negative  (NEGATIVE)   18  23:10    


 


Urine Ketones  Negative  (NEGATIVE)   18  23:10    


 


Urine Blood  Negative  (NEGATIVE)   18  23:10    


 


Urine Nitrite  Negative  (NEGATIVE)   18  23:10    


 


Urine Bilirubin  Negative  (NEGATIVE)   18  23:10    


 


Urine Urobilinogen  Negative mg/dL (0.2-1.0)   18  23:10    


 


Ur Leukocyte Esterase  3+  (NEGATIVE)  H  18  23:10    


 


Urine WBC (Auto)  13 /hpf (3-5)   18  23:10    


 


Urine RBC (Auto)  8 /hpf (0-3)   18  23:10    


 


Ur Epithelial Cells  Moderate /HPF (FEW)   18  23:10    


 


RPR Titer  Nonreactive  (NONREACTIVE)   18  07:30    


 


Hepatitis C Antibody  <0.1 s/co ratio (0.0-0.9)   18  07:30    








lab noted


Assessment: 





02/15/18 10:34


depression


suicidal ideation





Plan: 





1:1 for suicidal ideation


psychiatrist stat


recommend transferred to psychiatric facility for evaluation

## 2018-02-16 RX ADMIN — Medication SCH: at 23:27

## 2018-02-16 RX ADMIN — HYDROXYZINE PAMOATE PRN MG: 50 CAPSULE ORAL at 14:15

## 2018-02-16 RX ADMIN — IBUPROFEN PRN MG: 400 TABLET, FILM COATED ORAL at 15:35

## 2018-02-16 RX ADMIN — Medication SCH TAB: at 11:04

## 2018-02-16 RX ADMIN — IBUPROFEN PRN MG: 400 TABLET, FILM COATED ORAL at 01:27

## 2018-02-16 NOTE — PN
[facility] Progress Note


Note: 





Psychiatry


Attending's note :


Case presented by Psychiatric NP Chata.


Chart reviewed.Discussed with nursing staff on duty.


Noted recent transfer to [facility] psychiatric emergency department.


Evaluated at [facility] and released back to [facility] (2/15/18).


Clinical scenario : 


Erratic,inappropriate behavior (disrobing),agitation,restlessness,oblivious to 

redirections.


Patient is approached at bedside by this writer for evaluation of mental status.


Ms Gill is found lying in bed.Uncooperative.Refuses to converse with 

consultant.


Noted as bizarre,unpredictable,invested in somatic complaints (non-specific pain

).


Not aggressive.Able to follow simple commands.Calm in bed at time of this 

report.


Plan : 


Continue Constant observation for safety.


Agree with seroquel 50 mg po bid.


Titrate seroquel as clinically indicated.


Medical coverage.


Liaison Psychiatry will follow.

## 2018-02-16 NOTE — PN
Psychiatric Progress Note


Vital Signs: 


 Vital Signs











 Period  Temp  Pulse  Resp  BP Sys/Maya  Pulse Ox


 


 Last 24 Hr  96.8 F-98.2 F  79-95  18-20  /61-89  











Date of Session: 02/16/18


Chief Complaint:: "LEAVE ME ALONE"


HPI: Pt. admitted for cocaine, benzodiazepine, opiate, alcohol dependence.


ROS: Unremarkable


Current Medications: 


Active Medications











Generic Name Dose Route Start Last Admin





  Trade Name Freq  PRN Reason Stop Dose Admin


 


Acetaminophen  650 mg  02/15/18 18:47  02/16/18 06:12





  Tylenol -  PO   650 mg





  Q4H PRN   Administration





  FEVER   


 


Al Hydroxide/Mg Hydroxide  30 ml  02/15/18 18:47  





  Mylanta Oral Suspension -  PO   





  Q6H PRN   





  DYSPEPSIA   


 


Chlordiazepoxide HCl  25 mg  02/15/18 18:58  02/16/18 01:27





  Librium -  PO  02/16/18 18:57  25 mg





  Q4H PRN   Administration





  WITHDRAWAL(CONT SUBST)   


 


Chlordiazepoxide HCl  15 mg  02/15/18 23:00  02/16/18 11:03





  Librium -  PO  02/16/18 17:01  15 mg





  [id number] SANDIE   Administration


 


Chlordiazepoxide HCl  10 mg  02/16/18 23:00  





  Librium -  PO  02/17/18 17:01  





  [id number] SANDIE   


 


Diphenhydramine HCl  50 mg  02/15/18 22:00  02/15/18 22:59





  Benadryl -  PO   50 mg





  HS PRN   Administration





  INSOMNIA   


 


Eucalyptus/Menthol/Phenol/Sorbitol  1 each  02/15/18 18:47  





  Cepastat Lozenge -  MM   





  Q4H PRN   





  SORE THROAT   


 


Guaifenesin  10 ml  02/15/18 18:47  





  Robitussin Dm -  PO   





  Q6H PRN   





  COUGH   


 


Hydroxyzine Pamoate  50 mg  02/15/18 17:53  02/16/18 14:15





  Vistaril -  PO   50 mg





  Q4H PRN   Administration





  AGITATION   


 


Ibuprofen  600 mg  02/16/18 15:45  





  Motrin -  PO   





  Q6H PRN   





  PAIN LEVEL 6-10   


 


Loperamide HCl  4 mg  02/15/18 18:47  





  Imodium -  PO   





  Q6H PRN   





  DIARRHEA   


 


Magnesium Citrate  300 ml  02/15/18 18:47  





  Citroma -  PO   





  Q48H PRN   





  CONSTIPATION   


 


Magnesium Hydroxide  30 ml  02/15/18 18:47  





  Milk Of Magnesia -  PO   





  DAILY PRN   





  CONSTIPATION   


 


Methadone HCl  10 mg  02/17/18 10:00  





  Dolophine -  PO  02/17/18 10:01  





  ONCE ONE   


 


Methadone HCl  5 mg  02/18/18 06:00  





  Dolophine -  PO  02/18/18 06:01  





  ONCE ONE   


 


Prenatal Multivit/Folic Acid/Iron  1 tab  02/16/18 10:00  02/16/18 11:04





  Prenatal Vitamins (Sjr) -  PO   1 tab





  DAILY SANDIE   Administration


 


Pseudoephedrine/Triprolidine  1 combo  02/15/18 18:47  





  Actifed -  PO   





  TID PRN   





  NASAL CONGESTION   


 


Thiamine HCl  100 mg  02/15/18 22:00  02/15/18 22:54





  Vitamin B1 -  PO   100 mg





  HS SANDIE   Administration











Medication(s) Change(s): Seroquel 50mg BID ordered.


Current Side Effect: No


Lab tests ordered: No


Lab tests reviewed: Yes


Provider note:: Order placed by staff for psychiatric reconsultation.  Writer  

unable to enter room due to patient unwilling to put her cloth on. After 

multiple attempts by staff patient was able to comply and was assisted in 

putting her cloth on. Upon approach, patient  presented as irritable, agitated, 

restless, unpredictable,and a few minutes later was observed walking into 

different rooms. Pt. denies suicidal and homicidal ideation but refuses to 

answer further questions. Patient placed  1:1 for safety and unpredictable 

behavior.


Total face to face time:: 35





Mental Status Exam





- Mental Status Exam


Alert and Oriented to: Time, Place, Person


Cognitive Function: Fair


Patient Appearance: Unkempt


Mood: Withdrawn, Irritable


Affect: Mood Congruent


Patient Behavior: Fatigued, Uncooperative, Agitated


Speech Pattern: Delayed


Voice Loudness: Mildly Loud


Thought Process: Goal Oriented (Refusing to answer questions. )


Thought Disorder: Not Present


Hallucinations: Denies


Suicidal Ideation: Denies


Homicidal Ideation: Denies


Insight/Judgement: Poor


Sleep: Poorly


Appetite: Poor


Muscle strength/Tone: Normal


Gait/Station: Normal





Psychiatric Treatment Plan





- Problem List


(1) Insomnia


Current Visit: Yes   





(2) Alcohol dependence with uncomplicated withdrawal


Current Visit: Yes   





(3) Benzodiazepine dependence


Current Visit: Yes   





(4) Cocaine dependence, uncomplicated


Current Visit: Yes   





(5) Heroin dependence


Current Visit: Yes

## 2018-02-16 NOTE — PN
[facility] CIWA





- CIWA Score


Nausea/Vomitin-No Nausea/No Vomiting


Muscle Tremors: 3


Anxiety: 3


Agitation: 3


Paroxysmal Sweats: 3


Orientation: 0-Oriented


Tacttile Disturbances: 0-None


Auditory Disturbances: 0-None


Visual Disturbances: 0-None


Headache: 0-None Present


CIWA-Ar Total Score: 12





BHS COWS





- Scale


Resting Pulse: 1= AR 


Sweatin=Flushed/Facial Moisture


Restless Observation: 1= Difficult to Sit Still


Pupil Size: 5= Only Rim of Iris Seen


Bone or Joint Aches: 1= Mild Discomfort


Runny Nose/ Eye Tearin= Nasal Congestion


GI Upset > 30mins: 1= Stomach Cramp


Tremor Observation of Outstretched Hands: 1= Tremor Felt, Not Seen


Yawning Observation: 1= 1-2x During Session


Anxiety or Irritability: 1=Feels Anxious/Irritable


Goose Flesh Skin: 0=Smooth Skin


COWS Score: 15





BHS Progress Note (SOAP)


Subjective: 





irritable


agitation


sweats


shakes


interrupted sleep


Objective: 





18 11:28


 Vital Signs











Temperature  97.1 F L  18 11:26


 


Pulse Rate  88   18 11:26


 


Respiratory Rate  18   18 11:26


 


Blood Pressure  95/67   18 11:26


 


O2 Sat by Pulse Oximetry (%)      








 Laboratory Tests











  02/15/18





  23:10


 


Urine Color  Straw


 


Urine Appearance  Clear


 


Urine pH  5.0


 


Ur Specific Gravity  1.012


 


Urine Protein  Negative


 


Urine Glucose (UA)  Negative


 


Urine Ketones  Negative


 


Urine Blood  Negative


 


Urine Nitrite  Negative


 


Urine Bilirubin  Negative


 


Urine Urobilinogen  Negative


 


Ur Leukocyte Esterase  Trace


 


Urine WBC (Auto)  1


 


Urine RBC (Auto)  1


 


Ur Epithelial Cells  Rare








labs pending


[id number]


ambulating


no acute distress


Assessment: 





18 11:28


withdrawal sx


Plan: 





continue detox


increase fluids


labs pending

## 2018-02-17 ENCOUNTER — HOSPITAL ENCOUNTER (EMERGENCY)
Dept: HOSPITAL 74 - JER | Age: 38
Discharge: ANOTHER HOSPITAL | End: 2018-02-17
Payer: COMMERCIAL

## 2018-02-17 VITALS — HEART RATE: 77 BPM | SYSTOLIC BLOOD PRESSURE: 120 MMHG | DIASTOLIC BLOOD PRESSURE: 74 MMHG | TEMPERATURE: 97.3 F

## 2018-02-17 VITALS — BODY MASS INDEX: 19.3 KG/M2

## 2018-02-17 DIAGNOSIS — T76.21XA: Primary | ICD-10-CM
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
## 2018-02-17 RX ADMIN — Medication SCH MG: at 22:24

## 2018-02-17 RX ADMIN — Medication SCH TAB: at 10:48

## 2018-02-17 RX ADMIN — HYDROXYZINE PAMOATE PRN MG: 50 CAPSULE ORAL at 19:07

## 2018-02-17 NOTE — PN
[facility] CIWA





- CIWA Score


Nausea/Vomitin


Muscle Tremors: 3


Anxiety: 3


Agitation: 3


Paroxysmal Sweats: No Perspiration


Orientation: 0-Oriented


Tacttile Disturbances: 1-Very Mild Itch/Numbness


Auditory Disturbances: 1-Very Mild


Visual Disturbances: 0-None


Headache: 2-Mild


CIWA-Ar Total Score: 15





BHS COWS





- Scale


Resting Pulse: 2= -120


Sweatin= Chills/Flushing


Restless Observation: 3= Extraneous Movement


Pupil Size: 1= Pupils >than Normal


Bone or Joint Aches: 2= Severe Diffuse Aches


Runny Nose/ Eye Tearin= Nasal Congestion


GI Upset > 30mins: 2= Nausea/Diarrhea


Tremor Observation of Outstretched Hands: 2= Slight Tremor Visible


Yawning Observation: 1= 1-2x During Session


Anxiety or Irritability: 2=Irritable/Anxious


Goose Flesh Skin: 0=Smooth Skin


COWS Score: 17





BHS Progress Note (SOAP)


Subjective: 





ALERT,IRRITABLE,ANXIOUS,INTERRUPTED SLEEP,


Objective: 





18 10:49


 Vital Signs











Temperature  100.0 F H  18 09:51


 


Pulse Rate  99 H  18 09:51


 


Respiratory Rate  16   18 09:51


 


Blood Pressure  133/90   18 09:51


 


O2 Sat by Pulse Oximetry (%)      











Assessment: 





18 10:49


WITHDRAWAL SYMPTOM


Plan: 





CONTINUE DETOX,PSYCHIATRIC REEVALUATION
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
## 2018-02-18 VITALS — DIASTOLIC BLOOD PRESSURE: 66 MMHG | HEART RATE: 86 BPM | SYSTOLIC BLOOD PRESSURE: 106 MMHG | TEMPERATURE: 97.3 F

## 2018-02-18 LAB
ALBUMIN SERPL-MCNC: 3 G/DL (ref 3.4–5)
ALP SERPL-CCNC: 86 U/L (ref 45–117)
ALT SERPL-CCNC: 26 U/L (ref 12–78)
ANION GAP SERPL CALC-SCNC: 7 MMOL/L (ref 8–16)
AST SERPL-CCNC: 19 U/L (ref 15–37)
BILIRUB SERPL-MCNC: 0.2 MG/DL (ref 0.2–1)
BUN SERPL-MCNC: 39 MG/DL (ref 7–18)
CALCIUM SERPL-MCNC: 8.8 MG/DL (ref 8.5–10.1)
CHLORIDE SERPL-SCNC: 108 MMOL/L (ref 98–107)
CO2 SERPL-SCNC: 25 MMOL/L (ref 21–32)
CREAT SERPL-MCNC: 1.5 MG/DL (ref 0.55–1.02)
DEPRECATED RDW RBC AUTO: 16.4 % (ref 11.6–15.6)
GLUCOSE SERPL-MCNC: 103 MG/DL (ref 74–106)
HCT VFR BLD CALC: 28 % (ref 32.4–45.2)
HGB BLD-MCNC: 8.6 GM/DL (ref 10.7–15.3)
MCH RBC QN AUTO: 22.1 PG (ref 25.7–33.7)
MCHC RBC AUTO-ENTMCNC: 30.8 G/DL (ref 32–36)
MCV RBC: 71.9 FL (ref 80–96)
PLATELET # BLD AUTO: 352 K/MM3 (ref 134–434)
PMV BLD: 7.4 FL (ref 7.5–11.1)
POTASSIUM SERPLBLD-SCNC: 5.6 MMOL/L (ref 3.5–5.1)
PROT SERPL-MCNC: 7.3 G/DL (ref 6.4–8.2)
RBC # BLD AUTO: 3.9 M/MM3 (ref 3.6–5.2)
SODIUM SERPL-SCNC: 140 MMOL/L (ref 136–145)
WBC # BLD AUTO: 6.4 K/MM3 (ref 4–10)

## 2018-02-18 RX ADMIN — HYDROXYZINE PAMOATE PRN MG: 50 CAPSULE ORAL at 05:10

## 2018-02-18 RX ADMIN — HYDROXYZINE PAMOATE PRN MG: 50 CAPSULE ORAL at 01:17

## 2018-02-18 NOTE — DS
[facility] Detox Discharge Summary


Admission Date: 


02/15/18





Discharge Date: 02/18/18





- History


Present History: Alcohol Dependence, Opioid Dependence


Additional Comments: 





patient completed detox regimen, case discussed with psychiatrist Dr. Crain 

re evaluated the patient cleared for discharged, case discussed with Medical 

director, recommend input from nursing supervisor, case discussed with nursing 

supervisor, patient can be discharged [facility] up





- Physical Exam Results


Vital Signs: 


 Vital Signs











Temperature  97.3 F L  02/18/18 06:02


 


Pulse Rate  86   02/18/18 06:02


 


Respiratory Rate  16   02/18/18 06:02


 


Blood Pressure  106/66   02/18/18 06:02


 


O2 Sat by Pulse Oximetry (%)      











Pertinent Admission Physical Exam Findings: 





withdrawal sx


 Vital Signs











Temperature  97.3 F L  02/18/18 06:02


 


Pulse Rate  86   02/18/18 06:02


 


Respiratory Rate  16   02/18/18 06:02


 


Blood Pressure  106/66   02/18/18 06:02


 


O2 Sat by Pulse Oximetry (%)      








 Laboratory Last Values











POC Glucometer  199 UNITS ()   02/18/18  05:08    


 


Urine Color  Straw   02/15/18  23:10    


 


Urine Appearance  Clear   02/15/18  23:10    


 


Urine pH  5.0  (5.0-8.0)   02/15/18  23:10    


 


Ur Specific Gravity  1.012  (1.001-1.035)   02/15/18  23:10    


 


Urine Protein  Negative  (NEGATIVE)   02/15/18  23:10    


 


Urine Glucose (UA)  Negative  (NEGATIVE)   02/15/18  23:10    


 


Urine Ketones  Negative  (NEGATIVE)   02/15/18  23:10    


 


Urine Blood  Negative  (NEGATIVE)   02/15/18  23:10    


 


Urine Nitrite  Negative  (NEGATIVE)   02/15/18  23:10    


 


Urine Bilirubin  Negative  (NEGATIVE)   02/15/18  23:10    


 


Urine Urobilinogen  Negative mg/dL (0.2-1.0)   02/15/18  23:10    


 


Ur Leukocyte Esterase  Trace  (NEGATIVE)   02/15/18  23:10    


 


Urine WBC (Auto)  1 /hpf (3-5)   02/15/18  23:10    


 


Urine RBC (Auto)  1 /hpf (0-3)   02/15/18  23:10    


 


Ur Epithelial Cells  Rare /HPF (FEW)   02/15/18  23:10    








lab noted


 Laboratory Tests











  02/15/18 02/18/18





  23:10 05:08


 


POC Glucometer   199


 


Urine Color  Straw 


 


Urine Appearance  Clear 


 


Urine pH  5.0 


 


Ur Specific Gravity  1.012 


 


Urine Protein  Negative 


 


Urine Glucose (UA)  Negative 


 


Urine Ketones  Negative 


 


Urine Blood  Negative 


 


Urine Nitrite  Negative 


 


Urine Bilirubin  Negative 


 


Urine Urobilinogen  Negative 


 


Ur Leukocyte Esterase  Trace 


 


Urine WBC (Auto)  1 


 


Urine RBC (Auto)  1 


 


Ur Epithelial Cells  Rare 


 








CBC,CMP











POC Glucometer  199 UNITS ()   02/18/18  05:08    














- Treatment


Hospital Course: Detox Protocol Followed, Detoxed Safely, Responded well, 

Discharged Condition Good, Rehab Referral Accepted


Patient has Accepted a Rehab Referral to: [facility]





- Medication


Discharge Medications: 


Ambulatory Orders





NK [No Known Home Medication]  02/13/18 











- AMA


Did Patient Leave Against Medical Advice: No
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
## 2018-04-17 NOTE — ED ADULT TRIAGE NOTE - BP NONINVASIVE SYSTOLIC (MM HG)
75 yo M with significant PMHx for HTN, CAD, CHF,  HL, A-Fib and RLE DVT, on Coumadin, venous stasis and venous stasis ulcerations, was sent into ED from vascular office for right lower extremity erythema, suspected cellulitis with greenish color drainage. Patient states that he had a DVT 3 weeks ago and was seen by vascular where they placed a compression boot on right leg for thrombophlebity syndrome (post-DVT edema). Pt states the boot was too tight so he went back in 1 week ago after wife had cut the boot off because he could not feel his toes. Another compression boot was placed however he noticed that there was malodourous and serous fluid leaking from the edges. This prompted another visit to the vascular office, whereupon he was directed to the ED after boot was removed for cellulitis. No fever, no chills, no SOB, no chest pain.     ** Cellulitis  - f/u vascular --> Dressing twice a day changes: with non-adhesive pads, abd pads, kirlex and ace wraps; keep right leg elevated on 2 pillows to improve swelling  - c/w Unasyn   - keep right leg elevated on 2 pillows to improve swelling  - waiting for ID consult    **h/o DVT on coumadin / afib  - keep INR 2-3  -HR controlled / c/w lopressor    **HTN/CAD  - c/w home meds    **Chronic systolic heart failure  -EF=45% in 2017  -on BB, entresto (not available, wife is going to bring it), lasix 40 bid    ** COPD: has home O2  - c/w O2  - usese BiPAP at night    ** likely has KYLIE: stop bang questionnaire yields 8/8: high risk; follow up pulmonolgy as outpatient, will need polysomnography    **DVT ppx: systemic ATG    DISPO: comes from home 89

## 2018-10-20 ENCOUNTER — HOSPITAL ENCOUNTER (INPATIENT)
Dept: HOSPITAL 74 - YASAS | Age: 38
LOS: 5 days | Discharge: HOME | DRG: 897 | End: 2018-10-25
Attending: INTERNAL MEDICINE | Admitting: INTERNAL MEDICINE
Payer: COMMERCIAL

## 2018-10-20 VITALS — BODY MASS INDEX: 20.9 KG/M2

## 2018-10-20 DIAGNOSIS — F14.20: ICD-10-CM

## 2018-10-20 DIAGNOSIS — F11.23: Primary | ICD-10-CM

## 2018-10-20 DIAGNOSIS — F10.230: ICD-10-CM

## 2018-10-20 DIAGNOSIS — F19.24: ICD-10-CM

## 2018-10-20 DIAGNOSIS — E11.9: ICD-10-CM

## 2018-10-20 DIAGNOSIS — R10.13: ICD-10-CM

## 2018-10-20 DIAGNOSIS — F17.213: ICD-10-CM

## 2018-10-20 DIAGNOSIS — G47.00: ICD-10-CM

## 2018-10-20 DIAGNOSIS — F41.8: ICD-10-CM

## 2018-10-20 DIAGNOSIS — Z87.898: ICD-10-CM

## 2018-10-20 PROCEDURE — HZ2ZZZZ DETOXIFICATION SERVICES FOR SUBSTANCE ABUSE TREATMENT: ICD-10-PCS | Performed by: SURGERY

## 2018-10-20 RX ADMIN — Medication SCH MG: at 21:37

## 2018-10-20 RX ADMIN — NICOTINE SCH: 21 PATCH TRANSDERMAL at 21:37

## 2018-10-20 NOTE — HP
COWS





- Scale


Resting Pulse: 1= GA 


Sweatin= Chills/Flushing


Restless Observation: 3= Extraneous Movement


Pupil Size: 1= Pupils >than Normal


Bone or Joint Aches: 2= Severe Diffuse Aches


Runny Nose/ Eye Tearin= Runny Nose/Eyes


GI Upset > 30mins: 2= Nausea/Diarrhea


Tremor Observation: 2= Slight Tremor Visible


Yawning Observation: 1= 1-2x During Session


Anxiety or Irritability: 2=Irritable/Anxious


Goose Flesh Skin: 0=Smooth Skin


COWS Score: 17





CIWA Score





- CIWA Score


Nausea/Vomitin


Muscle Tremors: 2


Anxiety: 2


Agitation: 2


Paroxysmal Sweats: 1-Minimal Palms Moist


Orientation: 0-Oriented


Tacttile Disturbances: 1-Very Mild Itch/Numbness


Auditory Disturbances: 1-Very Mild


Visual Disturbances: 1-Very Mild Sensitivity


Headache: 2-Mild


CIWA-Ar Total Score: 14





Admission ROS [facility]





- HPI


Chief Complaint: 





i need help to stop using heroin and crack and alcohol


Allergies/Adverse Reactions: 


 Allergies











Allergy/AdvReac Type Severity Reaction Status Date / Time


 


No Known Allergies Allergy   Verified 10/20/18 16:59











History of Present Illness: 





this 38 years old female with heroin and crack dependence and alcohol,seeking 

detox,withdrawal symptom,last detox [facility] 02/15/18 to 18 


type2 dm non compliance,iddm


weight loss 


nicotine dependence


syncope


anxiety,depression,insomnia


no significant period of sobriety


Exam Limitations: No Limitations





- Ebola screening


Have you traveled outside of the country in the last 21 days: No (N)


Have you had contact with anyone from an Ebola affected area: No


Have you been sick,other than usual withdrawal symptoms: No


Do you have a fever: No





- Review of Systems


Constitutional: Chills, Loss of Appetite, Malaise, Night Sweats, Changes in 

sleep, Unintentional Wgt. Loss


EENT: reports: Tearing, Nose Congestion


Respiratory: reports: No Symptoms reported


Cardiac: reports: No Symptoms Reported


GI: reports: Poor Appetite, Vomiting, Abdominal cramping


: reports: No Symptoms Reported


Musculoskeletal: reports: Back Pain, Joint Pain, Muscle Pain


Integumentary: reports: Dryness


Neuro: reports: Headache, Tremors


Endocrine: reports: No Symptoms Reported


Hematology: reports: No Symptoms Reported


Psychiatric: reports: Judgement Intact, Mood/Affect Appropiate, Orientated x3, 

Anxious, Depressed (insomnia)





Patient History





- Patient Medical History


Hx Anemia: Yes (no medication)


Hx Asthma: No


Hx Chronic Obstructive Pulmonary Disease (COPD): No


Hx Cancer: No


Hx Cardiac Disorders: No


Hx Congestive Heart Failure: No


Hx Hypertension: No


Hx Hypercholesterolemia: No


Hx Pacemaker: No


HX Cerebrovascular Accident: No


Hx Seizures: No


Hx Dementia: No


Hx Diabetes: No


Hx Gastrointestinal Disorders: No


Hx Liver Disease: No


Hx Genitourinary Disorders: No


Hx Sexually Transmitted Disorders: No


Hx Renal Disease (ESRD): No


Hx Thyroid Disease: No


Hx Human Immunodeficiency Virus (HIV): No (Negative 2018)


Hx Hepatitis C: No


Hx Depression: Yes (no medication)


Hx Suicide Attempt: No (Denies suicidal ideation at this time)


Hx Bipolar Disorder: No


Hx Schizophrenia: No


Other Medical History: no suicidal,no homicidal





- Patient Surgical History


Past Surgical History: No


Hx Neurologic Surgery: No


Hx Cataract Extraction: No


Hx Cardiac Surgery: No


Hx Lung Surgery: No


Hx Breast Surgery: No


Hx Breast Biopsy: No


Hx Abdominal Surgery: No


Hx Appendectomy: No


Hx Cholecystectomy: No


Hx Genitourinary Surgery: No


Hx  Section: No


Hx Orthopedic Surgery: No


Anesthesia Reaction: No





- PPD History


Previous Implant?: Yes


Documented Results: Negative w/o proof


Implanted On Prior [facility] Admission?: No


PPD to be Administered?: Yes





- Reproductive History


Patient is a Female of Child Bearing Age (11 -55 yrs old): Yes


Last Menstrual Period: 05/15/17


Patient Pregnant: No





- Smoking Cessation


Smoking history: Current every day smoker


Have you smoked in the past 12 months: Yes


Aproximately how many cigarettes per day: 10


Hx Chewing Tobacco Use: No


Initiated information on smoking cessation: Yes


'Breaking Loose' booklet given: 10/20/18





- Substance & Tx. History


Hx Alcohol Use: Yes


Hx Substance Use: Yes


Substance Use Type: Alcohol, Cocaine, Heroin


Hx Substance Use Treatment: Yes ([facility] 02/15/18 to 18)





- Substances Abused


  ** Heroin


Route: Injection


Frequency: Daily


Amount used: 2 BUNDLE


Age of first use: 21


Date of Last Use: 10/20/18





  ** Crack


Route: Smoking


Frequency: Daily


Amount used: 2 BUNDLE


Age of first use: 21


Date of Last Use: 10/20/18





  ** Alcohol


Route: Oral


Frequency: Daily


Amount used: 2pints of vodka/2 of 40 ozs of beer


Age of first use: 17


Date of Last Use: 10/20/18





Family Disease History





- Family Disease History


Family Disease History: Diabetes: Mother (), Other: Father (alive, 

unknown), Mother





Admission Physical Exam [facility]





- Vital Signs


Vital Signs: 


 Vital Signs - 24 hr











  10/20/18





  14:34


 


Temperature 99.4 F


 


Pulse Rate 82


 


Respiratory 18





Rate 


 


Blood Pressure 104/73














- Physical


General Appearance: Yes: Moderate Distress, Tremorous, Irritable, Sweating, 

Anxious


HEENTM: Yes: Normal ENT Inspection, RIGO, Pharynx Normal, Nasal Congestion, 

Other (poor oral hygiene dermatitis of scalp)


Respiratory: Yes: Normal Breath Sounds, No Respiratory Distress


Neck: Yes: Within Normal Limits


Breast: Yes: Breast Exam Deferred


Cardiology: Yes: Within Normal Limits, Regular Rhythm, Regular Rate, S1, S2


Abdominal: Yes: Within Normal Limits, Normal Bowel Sounds, Non Tender, Flat


Genitourinary: Yes: Within Normal Limits


Musculoskeletal: Yes: full range of Motion, Back pain, Muscle Pain


Extremities: Yes: Within Normal Limits, Normal Range of Motion, Tremors


Neurological: Yes: CNs II-XII NML intact, Fully Oriented, Alert, Motor Strength 

5/5


Integumentary: Yes: Dry


Lymphatic: Yes: Within Normal Limits





- Diagnostic


(1) Opioid dependence with withdrawal


Current Visit: Yes   Status: Acute   





(2) Alcohol dependence with uncomplicated withdrawal


Current Visit: No   Status: Acute   





(3) Cocaine dependence, uncomplicated


Current Visit: No   Status: Chronic   





(4) Diabetes mellitus type 2, noninsulin dependent


Current Visit: No   Status: Chronic   





(5) Nicotine dependence


Current Visit: No   Status: Chronic   





(6) Weight loss


Current Visit: Yes   Status: Acute   





(7) Anxiety and depression


Current Visit: Yes   Status: Acute   





(8) Insomnia


Current Visit: Yes   Status: Acute   





Cleared for Admission [facility]





- Detox or Rehab


[facility] Level of Care: Medically Managed


Detox Regimen/Protocol: Methadone/Librium





[facility] Breath Alcohol Content


Breath Alcohol Content: 0





Urine Pregancy Test





- Result


Urine Pregnancy Test Results: Negative- NO Line Present





Urine Drug Screen





- Results


Drug Screen Negative: No


Urine Drug Screen Results: PATIENCE-Cocaine, OPI-Opiates, FEN-Fentanyl

## 2018-10-21 RX ADMIN — Medication SCH: at 23:45

## 2018-10-21 RX ADMIN — SELENIUM SULFIDE SCH: 2.5 LOTION TOPICAL at 11:48

## 2018-10-21 RX ADMIN — NICOTINE SCH: 21 PATCH TRANSDERMAL at 11:31

## 2018-10-21 RX ADMIN — Medication SCH TAB: at 11:26

## 2018-10-21 NOTE — EKG
Test Reason : 

Blood Pressure : ***/*** mmHG

Vent. Rate : 086 BPM     Atrial Rate : 086 BPM

   P-R Int : 142 ms          QRS Dur : 082 ms

    QT Int : 356 ms       P-R-T Axes : 070 004 051 degrees

   QTc Int : 426 ms

 

NORMAL SINUS RHYTHM

NORMAL ECG

WHEN COMPARED WITH ECG OF 13-FEB-2018 23:29,

NO SIGNIFICANT CHANGE WAS FOUND

Confirmed by DONNA GLASS MD ([id number]) on 10/21/2018 11:11:57 AM

 

Referred By:             Confirmed By:DONNA GLASS MD

## 2018-10-21 NOTE — PN
[facility] CIWA





- CIWA Score


Nausea/Vomitin-Mild Nausea/No Vomiting


Muscle Tremors: 3


Anxiety: 2


Agitation: 3


Paroxysmal Sweats: 1-Minimal Palms Moist


Orientation: 1-Uncertain about Date


Tacttile Disturbances: 1-Very Mild Itch/Numbness


Auditory Disturbances: 0-None


Visual Disturbances: 0-None


Headache: 1-Very Mild


CIWA-Ar Total Score: 13





[facility] COWS





- Scale


Resting Pulse: 1= [place] 


Sweatin= Chills/Flushing


Restless Observation: 1= Difficult to Sit Still


Pupil Size: 0= Normal to Room Light


Bone or Joint Aches: 2= Severe Diffuse Aches


Runny Nose/ Eye Tearin= Nasal Congestion


GI Upset > 30mins: 2= Nausea/Diarrhea


Tremor Observation of Outstretched Hands: 2= Slight Tremor Visible


Yawning Observation: 1= 1-2x During Session


Anxiety or Irritability: 1=Feels Anxious/Irritable


Goose Flesh Skin: 0=Smooth Skin


COWS Score: 12





[facility] Progress Note (SOAP)


Subjective: 





back pain sweat tremor anxiety trouble sleep at night


Objective: 





10/21/18 10:06


 Vital Signs











Temperature  98.4 F   10/21/18 06:00


 


Pulse Rate  98 H  10/21/18 06:00


 


Respiratory Rate  18   10/21/18 06:00


 


Blood Pressure  159/96   10/21/18 06:00


 


O2 Sat by Pulse Oximetry (%)      








lab not available 


Assessment: 





10/21/18 10:09


withdrawal sx


Plan: 





continue detox

## 2018-10-22 RX ADMIN — Medication SCH TAB: at 10:57

## 2018-10-22 RX ADMIN — HYDROXYZINE PAMOATE PRN MG: 25 CAPSULE ORAL at 06:39

## 2018-10-22 RX ADMIN — SELENIUM SULFIDE SCH: 2.5 LOTION TOPICAL at 10:58

## 2018-10-22 RX ADMIN — NICOTINE SCH: 21 PATCH TRANSDERMAL at 10:58

## 2018-10-22 RX ADMIN — RANITIDINE SCH MG: 150 TABLET ORAL at 22:36

## 2018-10-22 RX ADMIN — RANITIDINE SCH MG: 150 TABLET ORAL at 15:04

## 2018-10-22 RX ADMIN — AMLODIPINE BESYLATE SCH MG: 2.5 TABLET ORAL at 15:05

## 2018-10-22 RX ADMIN — METHADONE HYDROCHLORIDE SCH MG: 5 TABLET ORAL at 10:57

## 2018-10-22 RX ADMIN — Medication SCH MG: at 22:36

## 2018-10-22 NOTE — PN
S CIWA





- CIWA Score


Nausea/Vomitin-No Nausea/No Vomiting


Muscle Tremors: 2


Anxiety: 1-Mildly Anxious


Agitation: 1-Slight > Activity


Paroxysmal Sweats: 1-Minimal Palms Moist


Orientation: 1-Uncertain about Date


Tacttile Disturbances: 1-Very Mild Itch/Numbness


Auditory Disturbances: 0-None


Visual Disturbances: 0-None


Headache: 1-Very Mild


CIWA-Ar Total Score: 8





[facility] COWS





- Scale


Resting Pulse: 2= -120


Sweatin= Chills/Flushing


Restless Observation: 1= Difficult to Sit Still


Pupil Size: 0= Normal to Room Light


Bone or Joint Aches: 1= Mild Discomfort


Runny Nose/ Eye Tearin= Nasal Congestion


GI Upset > 30mins: 1= Stomach Cramp


Tremor Observation of Outstretched Hands: 1= Tremor Felt, Not Seen


Yawning Observation: 0= None


Anxiety or Irritability: 2=Irritable/Anxious


Goose Flesh Skin: 0=Smooth Skin


COWS Score: 10





[facility] Progress Note (SOAP)


Subjective: 





body ache muscle cramp tremor sweat trouble sleep at night


Objective: 





10/22/18 11:15


 Vital Signs











Temperature  98.2 F   10/22/18 09:25


 


Pulse Rate  121 H  10/22/18 09:25


 


Respiratory Rate  18   10/22/18 09:25


 


Blood Pressure  127/101 H  10/22/18 09:25


 


O2 Sat by Pulse Oximetry (%)      








 Laboratory Last Values











POC Glucometer  188 UNITS ()   10/21/18  11:35    








refuses lab work


risks benefits discuss that history of ammonia elevation with chronic dermatitis


Assessment: 





10/22/18 11:17


withdrawal sx


Plan: 





continue detox

## 2018-10-23 RX ADMIN — RANITIDINE SCH MG: 150 TABLET ORAL at 09:35

## 2018-10-23 RX ADMIN — Medication SCH MG: at 22:15

## 2018-10-23 RX ADMIN — Medication SCH TAB: at 09:35

## 2018-10-23 RX ADMIN — METHADONE HYDROCHLORIDE SCH MG: 5 TABLET ORAL at 09:35

## 2018-10-23 RX ADMIN — NICOTINE SCH: 21 PATCH TRANSDERMAL at 09:37

## 2018-10-23 RX ADMIN — SELENIUM SULFIDE SCH ADH.PATCH: 2.5 LOTION TOPICAL at 09:36

## 2018-10-23 RX ADMIN — HYDROXYZINE PAMOATE PRN MG: 25 CAPSULE ORAL at 09:37

## 2018-10-23 RX ADMIN — HYDROXYZINE PAMOATE PRN MG: 25 CAPSULE ORAL at 05:34

## 2018-10-23 RX ADMIN — AMLODIPINE BESYLATE SCH MG: 2.5 TABLET ORAL at 09:35

## 2018-10-23 RX ADMIN — RANITIDINE SCH MG: 150 TABLET ORAL at 22:15

## 2018-10-23 NOTE — PN
S CIWA





- CIWA Score


Nausea/Vomitin-No Nausea/No Vomiting


Muscle Tremors: 2


Anxiety: 4-Mod. Anxious/Guarded


Agitation: 4-Moderately Restless


Paroxysmal Sweats: No Perspiration


Orientation: 0-Oriented


Tacttile Disturbances: 0-None


Auditory Disturbances: 0-None


Visual Disturbances: 0-None


Headache: 0-None Present


CIWA-Ar Total Score: 10





[facility] COWS





- Scale


Resting Pulse: 1= ME 


Sweatin= No chills or Flushing


Restless Observation: 1= Difficult to Sit Still


Pupil Size: 0= Normal to Room Light


Bone or Joint Aches: 2= Severe Diffuse Aches


Runny Nose/ Eye Tearin= None


GI Upset > 30mins: 0= None


Tremor Observation of Outstretched Hands: 2= Slight Tremor Visible


Yawning Observation: 1= 1-2x During Session


Anxiety or Irritability: 2=Irritable/Anxious


Goose Flesh Skin: 0=Smooth Skin


COWS Score: 9





[facility] Progress Note (SOAP)


Subjective: 





[PATIENT C/O BODY ACHES, SHAKES AND ANXIETY/RESTLESSNESS. 


Objective: 





10/23/18 11:56


 Laboratory Tests











  10/21/18 10/22/18





  11:35 16:26


 


POC Glucometer  188  83








 Vital Signs











Temperature  98.7 F   10/23/18 09:39


 


Pulse Rate  103 H  10/23/18 09:39


 


Respiratory Rate  20   10/23/18 09:39


 


Blood Pressure  137/91   10/23/18 09:39


 


O2 Sat by Pulse Oximetry (%)      








SKIN WARM AND DRY


ALERT AND ORIENTED X 3


AMB AD [facility]


EXT FULL ROM, MILD TREMORS


ANXIOUS AND IRRITABLE








Assessment: 





10/23/18 11:57


WITHDRAWAL SX





Plan: 





CONTINUE DETOX AS ORDERED


ENCOURAGE ORAL FLUIDS


PATIENT REQUESTED INCREASE IN MTD DOSE


PATIENT EXPLAINED CURRENT MTD DOSE AND GOAL OF TREATMENT TO TITRATE DOSE NOT 

INCREASE


CONTINUE TO MONITOR CLINICALLY

## 2018-10-23 NOTE — PN
[facility] Progress Note


Note: 





Psychiatric nurse practitioner note:


Writer informed by RN that patient endorsed not wanting to live after she was 

denied additional doses of methadone. As per nursing staff, patient is seeking 

additional methadone and is becoming increasingly irritable and agitated 

because she is not being given what she request. Writer able to speak to 

patient concerning her statement of not wanting to live. Patient stated to 

writer, " I do not want to hurt myself. Nothing here is worth me hurting 

myself. I am just in pain." Writer able to communicate with nursing staff and 

was informed that a one time dose of benadryl 50mg will be ordered for 

agitation. Patient able to accept medication. Will continue to monitor.

## 2018-10-24 RX ADMIN — RANITIDINE SCH MG: 150 TABLET ORAL at 09:31

## 2018-10-24 RX ADMIN — INSULIN ASPART SCH UNITS: 100 INJECTION, SOLUTION INTRAVENOUS; SUBCUTANEOUS at 17:15

## 2018-10-24 RX ADMIN — Medication SCH MG: at 22:18

## 2018-10-24 RX ADMIN — NICOTINE SCH: 21 PATCH TRANSDERMAL at 11:49

## 2018-10-24 RX ADMIN — AMLODIPINE BESYLATE SCH MG: 2.5 TABLET ORAL at 11:49

## 2018-10-24 RX ADMIN — RANITIDINE SCH MG: 150 TABLET ORAL at 22:18

## 2018-10-24 RX ADMIN — SELENIUM SULFIDE SCH: 2.5 LOTION TOPICAL at 15:04

## 2018-10-24 RX ADMIN — Medication SCH TAB: at 09:32

## 2018-10-24 NOTE — PN
[facility] Progress Note (SOAP)


Subjective: 





interrupted sleep, sweats , upset stomach , no n/v d or fever.


Objective: 





10/24/18 11:24


 Vital Signs











Temperature  98.2 F   10/24/18 09:26


 


Pulse Rate  96 H  10/24/18 09:26


 


Respiratory Rate  18   10/24/18 09:26


 


Blood Pressure  134/73   10/24/18 09:26


 


O2 Sat by Pulse Oximetry (%)      








 Laboratory Tests











  10/21/18 10/22/18 10/24/18





  11:35 16:26 06:54


 


POC Glucometer  188  83  241








 Laboratory Tests











  10/21/18 10/22/18 10/24/18





  11:35 16:26 06:54


 


POC Glucometer  188  83  241











10/24/18 11:27


pt aox3 ,nad,  lying in bed 


10/24/18 11:30


pt refusing labs 


Assessment: 





10/24/18 11:28


withdrawal sx's 


dyspepsia


Plan: 





cont. detox 


increase fluids 


mylanta po 


d/c in am

## 2018-10-25 ENCOUNTER — HOSPITAL ENCOUNTER (EMERGENCY)
Dept: HOSPITAL 74 - JER | Age: 38
Discharge: HOME | End: 2018-10-25
Payer: COMMERCIAL

## 2018-10-25 VITALS — SYSTOLIC BLOOD PRESSURE: 124 MMHG | TEMPERATURE: 97.7 F | HEART RATE: 99 BPM | DIASTOLIC BLOOD PRESSURE: 98 MMHG

## 2018-10-25 VITALS — BODY MASS INDEX: 20.9 KG/M2

## 2018-10-25 VITALS — DIASTOLIC BLOOD PRESSURE: 56 MMHG | TEMPERATURE: 98 F | HEART RATE: 62 BPM | SYSTOLIC BLOOD PRESSURE: 112 MMHG

## 2018-10-25 DIAGNOSIS — F32.9: ICD-10-CM

## 2018-10-25 DIAGNOSIS — E11.9: ICD-10-CM

## 2018-10-25 DIAGNOSIS — D64.9: ICD-10-CM

## 2018-10-25 DIAGNOSIS — F17.210: ICD-10-CM

## 2018-10-25 DIAGNOSIS — F11.20: Primary | ICD-10-CM

## 2018-10-25 LAB
ALBUMIN SERPL-MCNC: 2.9 G/DL (ref 3.4–5)
ALP SERPL-CCNC: 78 U/L (ref 45–117)
ALT SERPL-CCNC: 21 U/L (ref 13–61)
ANION GAP SERPL CALC-SCNC: 5 MMOL/L (ref 8–16)
ANISOCYTOSIS BLD QL: (no result)
AST SERPL-CCNC: 16 U/L (ref 15–37)
BASOPHILS # BLD: 0.8 % (ref 0–2)
BILIRUB SERPL-MCNC: 0.1 MG/DL (ref 0.2–1)
BUN SERPL-MCNC: 59 MG/DL (ref 7–18)
CALCIUM SERPL-MCNC: 8.6 MG/DL (ref 8.5–10.1)
CHLORIDE SERPL-SCNC: 108 MMOL/L (ref 98–107)
CO2 SERPL-SCNC: 29 MMOL/L (ref 21–32)
CREAT SERPL-MCNC: 2.3 MG/DL (ref 0.55–1.3)
DEPRECATED RDW RBC AUTO: 15.3 % (ref 11.6–15.6)
EOSINOPHIL # BLD: 2.2 % (ref 0–4.5)
GLUCOSE SERPL-MCNC: 82 MG/DL (ref 74–106)
HCT VFR BLD CALC: 28.3 % (ref 32.4–45.2)
HGB BLD-MCNC: 8.9 GM/DL (ref 10.7–15.3)
LIPASE SERPL-CCNC: 101 U/L (ref 73–393)
LYMPHOCYTES # BLD: 28.8 % (ref 8–40)
MCH RBC QN AUTO: 22 PG (ref 25.7–33.7)
MCHC RBC AUTO-ENTMCNC: 31.6 G/DL (ref 32–36)
MCV RBC: 69.5 FL (ref 80–96)
MONOCYTES # BLD AUTO: 7.1 % (ref 3.8–10.2)
NEUTROPHILS # BLD: 61.1 % (ref 42.8–82.8)
PLATELET # BLD AUTO: 296 K/MM3 (ref 134–434)
PMV BLD: 7.2 FL (ref 7.5–11.1)
POTASSIUM SERPLBLD-SCNC: 5.1 MMOL/L (ref 3.5–5.1)
PROT SERPL-MCNC: 6.9 G/DL (ref 6.4–8.2)
RBC # BLD AUTO: 4.07 M/MM3 (ref 3.6–5.2)
SODIUM SERPL-SCNC: 141 MMOL/L (ref 136–145)
WBC # BLD AUTO: 5.9 K/MM3 (ref 4–10)

## 2018-10-25 PROCEDURE — 3E0337Z INTRODUCTION OF ELECTROLYTIC AND WATER BALANCE SUBSTANCE INTO PERIPHERAL VEIN, PERCUTANEOUS APPROACH: ICD-10-PCS

## 2018-10-25 RX ADMIN — INSULIN ASPART SCH: 100 INJECTION, SOLUTION INTRAVENOUS; SUBCUTANEOUS at 06:36

## 2018-10-25 NOTE — PDOC
History of Present Illness





- History of Present Illness


Initial Comments: 





10/25/18 11:34


The patient is a 38 year old female, with a significant past medical history of 

polysubstance abuse, depression, DM, who presents to the emergency department 

with, nausea, vomiting, and chills. Patient notes she was discharged from [facility] when her symptoms onset. While at detox she notes being administered 

20mg of methadone when normally 35mg outpatient. Patient endorses that she 

wants rehab and feels as if she will go back to drugs. 





She denies recent fevers, headache or dizziness. She denies recent diarrhea or 

constipation. She denies recent  dysuria, frequency, urgency or hematuria. She 

denies recent chest pain or shortness of breath.





Allergies: NKDA 


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








<Jaclyn Ruiz - Last Filed: 10/25/18 11:34>





- General


History Source: Patient, Old Records


Exam Limitations: No Limitations





<Iker Guy - Last Filed: 10/25/18 14:24>





- General


Stated Complaint: ABD PAIN


Time Seen by Provider: 10/25/18 10:35





Past History





<Jaclyn Ruiz - Last Filed: 10/25/18 11:34>





- Past Medical History


Anemia: Yes (no medication)


Asthma: No


Cancer: No


Cardiac Disorders: No


CVA: No


COPD: No


CHF: No


Dementia: No


Diabetes: No


GI Disorders: No


 Disorders: No


HTN: No


Hypercholesterolemia: No


Kidney Stones: No


Liver Disease: No


Seizures: No


Thyroid Disease: No





- Surgical History


Abdominal Surgery: No


Appendectomy: No


Cardiac Surgery: No


Cholecystectomy: No


Lung Surgery: No


Neurologic Surgery: No


Orthopedic Surgery: No





- Reproductive History


PID: No





- Suicide/Smoking/Psychosocial Hx


Smoking History: Current every day smoker


Have you smoked in the past 12 months: Yes


Number of Cigarettes Smoked Daily: 10


'Breaking Loose' booklet given: 10/20/18


Hx Alcohol Use: Yes


Drug/Substance Use Hx: Yes


Substance Use Type: Alcohol, Cocaine, Heroin


Hx Substance Use Treatment: Yes (sj 02/15/18 to 02/18/18)





<Iker Guy - Last Filed: 10/25/18 14:24>





- Past Medical History


Allergies/Adverse Reactions: 


 Allergies











Allergy/AdvReac Type Severity Reaction Status Date / Time


 


No Known Allergies Allergy   Verified 10/25/18 11:00











Home Medications: 


Ambulatory Orders





NK [No Known Home Medication]  02/13/18 











**Review of Systems





- Review of Systems


Able to Perform ROS?: Yes


Comments:: 





10/25/18 11:34


+GENERAL/CONSTITUTIONAL: Chills. No fever. No weakness.


HEAD, EYES, EARS, NOSE AND THROAT: No change in vision. No ear pain or 

discharge. No sore throat.


CARDIOVASCULAR: No chest pain or shortness of breath.


RESPIRATORY: No cough, wheezing, or hemoptysis.


+GASTROINTESTINAL: Nausea. Vomiting. No nausea, vomiting, diarrhea or 

constipation.


GENITOURINARY: No dysuria, frequency, or change in urination.


MUSCULOSKELETAL: No joint or muscle swelling or pain. No neck or back pain.


SKIN: No rash


NEUROLOGIC: No headache, vertigo, loss of consciousness, or change in strength/

sensation.


ENDOCRINE: No increased thirst. No abnormal weight change.


HEMATOLOGIC/LYMPHATIC: No anemia, easy bleeding, or history of blood clots.


ALLERGIC/IMMUNOLOGIC: No hives or skin allergy.





All Other Systems: Reviewed and Negative





<Jaclyn Ruiz - Last Filed: 10/25/18 11:34>





*Physical Exam





- Vital Signs


 Last Vital Signs











Temp Pulse Resp BP Pulse Ox


 


 98.4 F   92 H  14   92/58 L  95 


 


 10/25/18 10:32  10/25/18 10:32  10/25/18 10:32  10/25/18 10:32  10/25/18 10:32














- Physical Exam


Comments: 





10/25/18 11:35


+GENERAL: Tearful. Mildly anxious. Awake, alert, and fully oriented, in no 

acute distress


HEAD: No signs of trauma


ENT: Hearing grossly normal. Moist mucosa


NECK: Normal ROM, supple.


LUNGS: Breath sounds equal, clear to auscultation bilaterally.  No wheezes, and 

no crackles


HEART: Regular rate and rhythm, normal S1 and S2, no murmurs, rubs or gallops


ABDOMEN: Soft, nontender, normoactive bowel sounds.  No guarding, no rebound.  

No masses


EXTREMITIES: Normal range of motion, no edema.  No clubbing or cyanosis. No 

cords, erythema, or tenderness


NEUROLOGICAL: Cranial nerves II through XII grossly intact.  Normal speech, 

normal gait


SKIN: Warm, Dry, normal turgor, no rashes or lesions noted.








<Jaclyn Ruiz - Last Filed: 10/25/18 11:34>





ED Treatment Course





- Medications


Given in the ED: 


ED Medications














Discontinued Medications














Generic Name Dose Route Start Last Admin





  Trade Name Daja  PRN Reason Stop Dose Admin


 


Methadone HCl  20 mg  10/25/18 10:46  10/25/18 11:19





  Dolophine -  PO  10/25/18 10:47  20 mg





  ONCE ONE   Administration





     





     





     





     














<AdinjanessaJaclyn butler - Last Filed: 10/25/18 11:34>





- LABORATORY


CBC & Chemistry Diagram: 


 10/25/18 12:51





 10/25/18 12:51





<Iker Guy - Last Filed: 10/25/18 14:24>





Medical Decision Making





- Medical Decision Making





10/25/18 10:47





A portion of this note was documented by scribe services under my direction. I 

have reviewed the details of the note, within reason, and agree with the 

documentation with the following case summary and management plan written by 

me. 





Patient treated in the ED.





Nursing notes are reviewed and incorporated into the medical decision-making.


Vital signs reviewed.





38-year-old female patient with history of diabetes and polysubstance abuse 

presents from [address] [place] [address] for withdrawal. The patient reports that she's been 

receiving 5 mg of methadone daily at the facility but has been feeling 

chronically nauseous and sweating and flulike symptoms. She reports typically 

as an outpatient dosing of 35 mg methadone daily. Upon discharge, patient 

continued felt the symptoms and came to the ER. She reports that she did not 

have a place for rehabilitation.





The patient's symptoms are consistent with narcotic withdrawal. It is possible 

that the patient may be somewhat underdosed from the methadone.  We'll give the 

patient additional 20 mg of methadone.  was contacted and will see 

the patient regards to inpatient rehabilitation placement.





10/25/18 12:26


I had spoke to the RN at [facility] who was caring for the patient. The patient 

has been taking methadone at the facility but upon discharge, the patient was 

prior calm and relaxed started complaining and demanding that she stay at the 

detox facility. Patient then stated that she was having severe abdominal pain 

but refused lab work and other workup. Given the circumstances, the patient was 

sent to ED.





10/25/18 14:19





Laboratory data was reviewed. Patient noted to have an elevated creatinine. 

However, patient does have a history of chronic kidney disease. The patient 

insists that she receives hot foods and she does not want to leave the 

hospital. After lengthy discussion, I advised the patient that I cannot 

continue to give narcotics and other medications and that she would benefit 

from outpatient rehabilitation. The  has seen the patient. I 

advised that the patient would benefit from such services. Instructions were 

given to the patient in regards for follow-up. Patient is cleared for discharge.





 had given services referral include rehab for the patient.





I discussed the physical exam findings, ancillary test results and final 

diagnoses with the patient.  I answered all of the patient's questions.  The 

patient was satisfied with the care received and felt comfortable with the 

discharge plan and treatment plan.  The patient will call their primary care 

physician within 24 hours to arrange follow-up and will return to the Emergency 

Department with any new, persistant or worsening symptoms. 








<Iker Guy - Last Filed: 10/25/18 14:24>





*DC/Admit/Observation/Transfer





- Attestations


Scribe Attestion: 





10/25/18 11:35





Documentation prepared by Jaclyn Ruiz, acting as medical scribe for Iker Guy MD.





<Jaclyn Ruiz - Last Filed: 10/25/18 11:34>





- Discharge Dispostion


Decision to Admit order: No





<Iker Guy - Last Filed: 10/25/18 14:24>


Diagnosis at time of Disposition: 


 Narcotic addiction








- Discharge Dispostion


Disposition: HOME


Condition at time of disposition: Stable





- Referrals


Referrals: 


Marcy Etienne MD [Staff Physician] - 





- Patient Instructions


Printed Discharge Instructions:  [facility] for Opioid Addiction


Additional Instructions: 


You have a history of chronic kidney problems.


Please take this copy of your blood work and bring it to your doctor.


In the meantime, you would benefit greatly from rehab.


The list given to you by the  are places that can help you.


Please go first thing in the morning to rehab or [facility] as discussed 

prior by [facility].

## 2018-10-25 NOTE — DS
[facility] Detox Discharge Summary


Admission Date: 


10/20/18





Discharge Date: 10/25/18





- History


Present History: Alcohol Dependence, Opioid Dependence





- Physical Exam Results


Vital Signs: 


 Vital Signs











Temperature  97.7 F   10/25/18 07:07


 


Pulse Rate  99 H  10/25/18 07:07


 


Respiratory Rate  20   10/25/18 07:07


 


Blood Pressure  124/98   10/25/18 07:07


 


O2 Sat by Pulse Oximetry (%)      











Pertinent Admission Physical Exam Findings: 





alcohol and opiate withdrawal sx


 Vital Signs











Temperature  97.7 F   10/25/18 07:07


 


Pulse Rate  99 H  10/25/18 07:07


 


Respiratory Rate  20   10/25/18 07:07


 


Blood Pressure  124/98   10/25/18 07:07


 


O2 Sat by Pulse Oximetry (%)      








 Laboratory Last Values











POC Glucometer  108 UNITS ()   10/25/18  05:51    








 Laboratory Last Values





lab not available








- Treatment


Hospital Course: Detox Protocol Followed, Detoxed Safely, Responded well, 

Discharged Condition Good, Rehab Referral Accepted


Patient has Accepted a Rehab Referral to: [facility]





- Medication


Discharge Medications: 


Ambulatory Orders





NK [No Known Home Medication]  02/13/18 











- Diagnosis


(1) Drug-induced mood disorder


Status: Suspected   





(2) Weight loss


Status: Acute   





(3) Alcohol dependence with uncomplicated withdrawal


Status: Acute   





(4) Diabetes mellitus type 2, noninsulin dependent


Status: Chronic   





(5) Nicotine dependence


Status: Acute   


Qualifiers: 


   Nicotine product type: cigarettes   Substance use status: in withdrawal   

Qualified Code(s): F17.213 - Nicotine dependence, cigarettes, with withdrawal   





(6) Opioid dependence with withdrawal


Status: Acute   





- AMA


Did Patient Leave Against Medical Advice: No

## 2018-10-26 ENCOUNTER — HOSPITAL ENCOUNTER (INPATIENT)
Dept: HOSPITAL 74 - YASAS | Age: 38
LOS: 6 days | Discharge: LEFT BEFORE DISCHARGE | DRG: 894 | End: 2018-11-01
Attending: PSYCHIATRY & NEUROLOGY | Admitting: PSYCHIATRY & NEUROLOGY
Payer: COMMERCIAL

## 2018-10-26 VITALS — BODY MASS INDEX: 21.6 KG/M2

## 2018-10-26 DIAGNOSIS — F17.210: ICD-10-CM

## 2018-10-26 DIAGNOSIS — F10.20: ICD-10-CM

## 2018-10-26 DIAGNOSIS — F12.20: ICD-10-CM

## 2018-10-26 DIAGNOSIS — N18.9: ICD-10-CM

## 2018-10-26 DIAGNOSIS — F79: ICD-10-CM

## 2018-10-26 DIAGNOSIS — F19.24: ICD-10-CM

## 2018-10-26 DIAGNOSIS — F13.20: ICD-10-CM

## 2018-10-26 DIAGNOSIS — F11.23: Primary | ICD-10-CM

## 2018-10-26 DIAGNOSIS — F14.20: ICD-10-CM

## 2018-10-26 DIAGNOSIS — D50.9: ICD-10-CM

## 2018-10-26 DIAGNOSIS — E11.9: ICD-10-CM

## 2018-10-26 PROCEDURE — G0009 ADMIN PNEUMOCOCCAL VACCINE: HCPCS

## 2018-10-26 PROCEDURE — G0008 ADMIN INFLUENZA VIRUS VAC: HCPCS

## 2018-10-26 PROCEDURE — HZ42ZZZ GROUP COUNSELING FOR SUBSTANCE ABUSE TREATMENT, COGNITIVE-BEHAVIORAL: ICD-10-PCS | Performed by: PSYCHIATRY & NEUROLOGY

## 2018-10-26 RX ADMIN — Medication PRN MG: at 22:03

## 2018-10-26 RX ADMIN — Medication SCH MG: at 22:03

## 2018-10-26 NOTE — HP
Admission ROS [facility]


Chief Complaint: 





Seeking admission to Rehab


Allergies/Adverse Reactions: 


 Allergies











Allergy/AdvReac Type Severity Reaction Status Date / Time


 


No Known Allergies Allergy   Verified 10/26/18 18:05











History of Present Illness: 





38 years old female with alcohol and opioid dependence is seeking admission to 

Rehab. Patient reports [facility] Rehab[facility] at Health [facility] in [facility]. She has 

medical history of DM type 2, Anemia and depression. She denies suicidal 

attempt at this time.


Exam Limitations: No Limitations





- Ebola screening


Have you traveled outside of the country in the last 21 days: No


Have you had contact with anyone from an Ebola affected area: No


Have you been sick,other than usual withdrawal symptoms: No


Do you have a fever: No





- Review of Systems


Constitutional: No Symptoms Reported


EENT: reports: No Symptoms Reported


Respiratory: reports: No Symptoms reported


Cardiac: reports: No Symptoms Reported


GI: reports: No Symptoms Reported


: reports: No Symptoms Reported


Musculoskeletal: reports: No Symptoms Reported


Integumentary: reports: No Symptoms Reported


Neuro: reports: No Symptoms reported


Endocrine: reports: No Symptoms Reported


Hematology: reports: No Symptoms Reported


Psychiatric: reports: Mood/Affect Appropiate, Orientated x3


Other Systems: Reviewed and Negative





Patient History





- Patient Medical History


Hx Anemia: Yes (Not on medication)


Hx Asthma: No


Hx Chronic Obstructive Pulmonary Disease (COPD): No


Hx Cancer: No


Hx Cardiac Disorders: No


Hx Congestive Heart Failure: No


Hx Hypertension: No


Hx Hypercholesterolemia: No


Hx Pacemaker: No


HX Cerebrovascular Accident: No


Hx Seizures: No


Hx Dementia: No


Hx Diabetes: Yes (Not on medication)


Hx Gastrointestinal Disorders: No


Hx Liver Disease: No


Hx Genitourinary Disorders: No


Hx Sexually Transmitted Disorders: No


Hx Renal Disease (ESRD): No


Hx Thyroid Disease: No


Hx Human Immunodeficiency Virus (HIV): No (Negative 2018)


Hx Hepatitis C: No


Hx Depression: Yes (Not medication)


Hx Suicide Attempt: No (Denies suicidal ideation at this time)


Hx Bipolar Disorder: No


Hx Schizophrenia: No





- Patient Surgical History


Past Surgical History: No





- PPD History


Documented Results: Negative w/proof


Implanted On Prior [facility] Admission?: Yes


Date: 10/22/18


PPD to be Administered?: No





- Reproductive History


Patient is a Female of Child Bearing Age (11 -55 yrs old): Yes


Last Menstrual Period: 05/15/18


Patient Pregnant: No





- Smoking Cessation


Smoking history: Current every day smoker


Have you smoked in the past 12 months: Yes


Aproximately how many cigarettes per day: 10


Hx Chewing Tobacco Use: No


Initiated information on smoking cessation: Yes


'Breaking Loose' booklet given: 10/26/18





- Substance & Tx. History


Hx Alcohol Use: Yes


Hx Substance Use: Yes


Hx Substance Use Treatment: Yes





Family Disease History





- Family Disease History


Family Disease History: Diabetes: Mother (), Other: Father (alive, 

unknown), Mother





Admission Physical Exam [facility]





- Vital Signs


Vital Signs: 


 Vital Signs - 24 hr











  10/26/18





  12:58


 


Temperature 98.4 F


 


Pulse Rate 82


 


Respiratory 18





Rate 


 


Blood Pressure 107/65














- Physical


General Appearance: Yes: No Apparent Distress, Disheveled


HEENTM: Yes: EOMI, Normal ENT Inspection, Normal Voice, RIGO, Other (missing 

upper and lower teeth. Reports loss of dentures about a year ago)


Respiratory: Yes: Lungs Clear, Normal Breath Sounds, No Respiratory Distress


Neck: Yes: Supple


Breast: Yes: Breast Exam Deferred


Cardiology: Yes: Regular Rhythm, Regular Rate


Abdominal: Yes: Normal Bowel Sounds, Soft


Genitourinary: Yes: Within Normal Limits


Back: Yes: Normal Inspection


Musculoskeletal: Yes: Within Normal Limits


Extremities: Yes: Normal Inspection


Neurological: Yes: Within Normal Limits, Alert, Normal Mood/Affect


Integumentary: Yes: Warm


Lymphatic: Yes: Within Normal Limits





- Diagnostic


(1) Anxiety


Current Visit: Yes   Status: Chronic   





(2) Depression


Current Visit: Yes   Status: Chronic   


Qualifiers: 


   Depression Type: unspecified   Qualified Code(s): F32.9 - Major depressive 

disorder, single episode, unspecified   





(3) Opioid dependence


Current Visit: Yes   Status: Chronic   


Qualifiers: 


   Substance use status: uncomplicated   Qualified Code(s): F11.20 - Opioid 

dependence, uncomplicated   





(4) Nicotine dependence


Current Visit: Yes   Status: Chronic   


Qualifiers: 


   Nicotine product type: cigarettes   Substance use status: uncomplicated   

Qualified Code(s): F17.210 - Nicotine dependence, cigarettes, uncomplicated   





(5) Anemia


Current Visit: Yes   Status: Chronic   


Qualifiers: 


   Iron deficiency anemia type: unspecified iron deficiency 





(6) Benzodiazepine dependence


Current Visit: Yes   Status: Chronic   





(7) Diabetes mellitus type 2, noninsulin dependent


Current Visit: No   Status: Chronic   





S Breath Alcohol Content


Breath Alcohol Content: 0





Urine Pregancy Test





- Result


Urine Pregnancy Test Results: Negative- NO Line Present





Urine Drug Screen





- Results


Drug Screen Negative: No


Urine Drug Screen Results: OPI-Opiates, BZO-Benzodiazepines, MTD-Methadone, FEN-

Fentanyl

## 2018-10-27 LAB
APPEARANCE UR: (no result)
BILIRUB UR STRIP.AUTO-MCNC: NEGATIVE MG/DL
COLOR UR: (no result)
EPITH CASTS URNS QL MICRO: (no result) /HPF
KETONES UR QL STRIP: NEGATIVE
LEUKOCYTE ESTERASE UR QL STRIP.AUTO: (no result)
MUCOUS THREADS URNS QL MICRO: (no result)
NITRITE UR QL STRIP: NEGATIVE
PH UR: 5 [PH] (ref 5–8)
PROT UR QL STRIP: NEGATIVE
PROT UR QL STRIP: NEGATIVE
SP GR UR: 1.02 (ref 1.01–1.03)
UROBILINOGEN UR STRIP-MCNC: NEGATIVE MG/DL (ref 0.2–1)

## 2018-10-27 RX ADMIN — INSULIN ASPART SCH: 100 INJECTION, SOLUTION INTRAVENOUS; SUBCUTANEOUS at 17:16

## 2018-10-27 RX ADMIN — Medication SCH MG: at 21:46

## 2018-10-27 RX ADMIN — INSULIN ASPART SCH: 100 INJECTION, SOLUTION INTRAVENOUS; SUBCUTANEOUS at 12:08

## 2018-10-27 RX ADMIN — NICOTINE SCH MG: 14 PATCH, EXTENDED RELEASE TRANSDERMAL at 11:00

## 2018-10-27 RX ADMIN — INSULIN ASPART SCH: 100 INJECTION, SOLUTION INTRAVENOUS; SUBCUTANEOUS at 06:59

## 2018-10-27 RX ADMIN — Medication SCH TAB: at 11:06

## 2018-10-27 NOTE — EKG
Test Reason : 

Blood Pressure : ***/*** mmHG

Vent. Rate : 089 BPM     Atrial Rate : 089 BPM

   P-R Int : 144 ms          QRS Dur : 084 ms

    QT Int : 364 ms       P-R-T Axes : 068 023 054 degrees

   QTc Int : 442 ms

 

NORMAL SINUS RHYTHM

NORMAL ECG

WHEN COMPARED WITH ECG OF 20-OCT-2018 21:16,

NO SIGNIFICANT CHANGE WAS FOUND

Confirmed by MD Ernst Edward ([id number]) on 10/27/2018 2:46:20 PM

 

Referred By:             Confirmed By:Aaron Ernst MD

## 2018-10-28 RX ADMIN — ALUMINUM HYDROXIDE, MAGNESIUM HYDROXIDE, AND SIMETHICONE PRN ML: 200; 200; 20 SUSPENSION ORAL at 21:27

## 2018-10-28 RX ADMIN — Medication PRN MG: at 21:26

## 2018-10-28 RX ADMIN — NICOTINE SCH MG: 14 PATCH, EXTENDED RELEASE TRANSDERMAL at 09:55

## 2018-10-28 RX ADMIN — Medication SCH TAB: at 09:54

## 2018-10-28 RX ADMIN — Medication SCH MG: at 21:26

## 2018-10-28 RX ADMIN — CYCLOBENZAPRINE HYDROCHLORIDE PRN MG: 10 TABLET, FILM COATED ORAL at 10:36

## 2018-10-28 RX ADMIN — ALUMINUM HYDROXIDE, MAGNESIUM HYDROXIDE, AND SIMETHICONE PRN ML: 200; 200; 20 SUSPENSION ORAL at 01:29

## 2018-10-28 RX ADMIN — INSULIN ASPART SCH: 100 INJECTION, SOLUTION INTRAVENOUS; SUBCUTANEOUS at 08:02

## 2018-10-28 RX ADMIN — INSULIN ASPART SCH: 100 INJECTION, SOLUTION INTRAVENOUS; SUBCUTANEOUS at 11:55

## 2018-10-28 RX ADMIN — INSULIN ASPART SCH: 100 INJECTION, SOLUTION INTRAVENOUS; SUBCUTANEOUS at 16:40

## 2018-10-28 RX ADMIN — ALUMINUM HYDROXIDE, MAGNESIUM HYDROXIDE, AND SIMETHICONE PRN ML: 200; 200; 20 SUSPENSION ORAL at 11:25

## 2018-10-28 RX ADMIN — CYCLOBENZAPRINE HYDROCHLORIDE PRN MG: 10 TABLET, FILM COATED ORAL at 21:26

## 2018-10-28 NOTE — PN
[facility] Progress Note


Note: 





patient with polysubstance dependence with chronic renal insufficiency,alcohol 

dependence


admitted at [facility] [place] from 10/10/18 to 10/15/18 detox


seen in er at [facility] on 10/15/18 ,evaluated for abdominal pain


admitted in rehab on 10/16/18


seen by dr padilla earlier


conflict information about methadone program ,later denied


bp 118/76,p92,r20,t97.6


alert,no respitatory distress


heent nomal


multiple lesions of scalp,chronic,but on selson lotion


lung clear,no wheezing


abdomen soft,no disten,no pain ,no tenderness,bowel sound active


extremity no pain no swelling


patient admitted that she is using heroin and street methadone


impression functional gi disorder,withdrawal


               anemia


               chronic renal infufficiency


treatment flexeril 10 mgs po tid prn


              clonidine 0.1 mg po bid 


              close monitoring


              psychiatric evaluation


              repeat cbc,cmp.,amylase,lipase,ammonia level in am

## 2018-10-28 NOTE — PN
[facility] Progress Note


Note: 





MD'S NOTE:





CALLED AT 2:50AM TO SEE THE PT. WHO IS IN SEVERE WITHDRAWALS AND WANTS 

METHADONE WHICH WAS STOPPED 


ABRUPTLY.





SUB: CRAMPY ABD. PAIN++


        NAUSEA+





O/E:  THE PT. IS KEENAN X 3, NOT DYSPNIEC, NO CYANOSIS, VERY VERY RESTLESS AND 

CRYING AND WALKING AROUND+++.


S/E:   ABD: SOFT, MILD DIFFUSE TENDERNESS+, NO RIGIDITY, B.S.+


        CVS: -JVD, NL HEART SOUNDS, NO MURMURS


     LUNGS:VESICULAR BREATH SOUNDS, NO RALES, NO RHONCHI





IMPRESSION: SEVERE WITHDRAWALS FROM OPIATES





PLANS: METHADONE 10 MGS. PO X 1


          CLONIDINE 0.1 MG PO Q8H PRN


          CLOSE MONITORING 


          WILL F/U: AS NEEDED





PROVIDER: RIC OMHAN MD

## 2018-10-28 NOTE — PN
[facility] Progress Note


Note: 





Psychiatric nurse practitioner on call note:


Call received concerning patient who earlier was irritable, difficult to 

redirect, demanding, screaming and disrobing on the unit. Patient with a 

history of polysubstance dependence, alcohol dependence, and chronic renal 

insufficiency. Patient is well known to the facility. Patient with a history of 

behavior dysregulation when her request for additional methadone is not met. 

Patient was seen by writer (10/23) while in detox after she became disruptive 

and difficult to redirect on the milieu. One time dose of benadryl 50mg was 

given to patient with good effect. Patient seen by Dr. Etienne this morning, 

flexeril 10mg TID  and clonidine 0.1 BID ordered. Psych consult ordered. As per 

nursing staff patient accepted flexeril and clonidine and is currently laying 

down in bed. Nursing staff informed to contact writer if further assistance is 

needed.

## 2018-10-29 RX ADMIN — BACITRACIN ZINC SCH GM: 500 OINTMENT TOPICAL at 11:20

## 2018-10-29 RX ADMIN — INSULIN ASPART SCH: 100 INJECTION, SOLUTION INTRAVENOUS; SUBCUTANEOUS at 12:00

## 2018-10-29 RX ADMIN — INSULIN ASPART SCH: 100 INJECTION, SOLUTION INTRAVENOUS; SUBCUTANEOUS at 17:12

## 2018-10-29 RX ADMIN — NICOTINE SCH: 14 PATCH, EXTENDED RELEASE TRANSDERMAL at 11:00

## 2018-10-29 RX ADMIN — RANITIDINE SCH MG: 150 TABLET ORAL at 21:07

## 2018-10-29 RX ADMIN — RANITIDINE SCH MG: 150 TABLET ORAL at 11:20

## 2018-10-29 RX ADMIN — CYCLOBENZAPRINE HYDROCHLORIDE PRN MG: 10 TABLET, FILM COATED ORAL at 11:20

## 2018-10-29 RX ADMIN — BACITRACIN ZINC SCH GM: 500 OINTMENT TOPICAL at 21:07

## 2018-10-29 RX ADMIN — ALUMINUM HYDROXIDE, MAGNESIUM HYDROXIDE, AND SIMETHICONE PRN ML: 200; 200; 20 SUSPENSION ORAL at 18:12

## 2018-10-29 RX ADMIN — ALUMINUM HYDROXIDE, MAGNESIUM HYDROXIDE, AND SIMETHICONE PRN ML: 200; 200; 20 SUSPENSION ORAL at 04:21

## 2018-10-29 RX ADMIN — Medication SCH MG: at 21:07

## 2018-10-29 RX ADMIN — CYCLOBENZAPRINE HYDROCHLORIDE PRN MG: 10 TABLET, FILM COATED ORAL at 21:07

## 2018-10-29 RX ADMIN — Medication SCH: at 11:00

## 2018-10-29 RX ADMIN — INSULIN ASPART SCH: 100 INJECTION, SOLUTION INTRAVENOUS; SUBCUTANEOUS at 08:24

## 2018-10-29 NOTE — HP
Psychiatrist Admission





- Data


Date of interview: 10/29/18


Admission source: 


Identifying data: This is the first admeission [address] Inpatient 

Rehabilitation Unit for this 38 years oldfemale, single mother of one, living 

with family, unemployed, with history of Alcohol, Opioids dependence, with no 

psychiatric hospitalization history, with no suicidal, homicidal history, 

currently sent from [facility] detox for  Rehabilitation ptreatment.


Medical History: DM-II, denies other significant medical problems.


Psychiatric History: Patient reports no psychiatric hospitalization history, no 

psychiatric medications taking prior to admission, no suicidal, homicidal 

history reportable, patient reports [employer], 4 grades, reports never working, 

currently on SSD due nto learning disorder. As per computer patient has been 

evaluated by Dr. Ziegler ON 02/2018 AT [facility], during detox protocol patient was 

pronounced psychotic with suicidal ideation, sent to [facility] for 

safety and evaluation, has been assessed there and sent back to [facility] to 

continue Detox protocol.  As per writer opinion patient suffers intellectual 

dissability,  and mauricio's intelligence and mental ability is below the 

average.  Haldol 1mg po, prn, q4 for agitation iwas ordered.


Physical/Sexual Abuse/Trauma History: Denies, unclear


Vital Signs: 


 Vital Signs - 24 hr











  10/28/18 10/29/18 10/29/18





  20:50 00:30 03:30


 


Pulse Rate 85  


 


Respiratory  18 18





Rate   


 


Blood Pressure 101/64  











Allergies/Adverse Reactions: 


 Allergies











Allergy/AdvReac Type Severity Reaction Status Date / Time


 


No Known Allergies Allergy   Verified 10/26/18 18:05














- Substance Abuse/Tx History


Hx Alcohol Use: Yes (Long history of Alcohol dependence)


Hx Substance Use: Yes


Substance Use Type: Opiates (Long history of Opioids dependence)





Mental Status Exam





- Mental Status Exam


Alert and Oriented to: Person


Cognitive Function: Fair


Patient Appearance: Unkempt


Mood: Anxious, Irritable


Affect: Inappropriate, Labile


Patient Behavior: Restless, Cooperative, Agitated


Speech Pattern: Appropriate


Voice Loudness: Mildly Soft/Quiet


Thought Process: Circumstantial, Goal Oriented


Thought Disorder: Being Controlled


Hallucinations: Denies


Suicidal Ideation: Denies


Homicidal Ideation: Denies


Insight/Judgement: Fair


Sleep: Difficulty falling asleep


Appetite: Weight loss


Muscle strength/Tone: Mild Hypertonicity, Moderate Hypertonicity


Gait/Station: Deferred


Additional Comments: Obsevration.  Rehab Unit Care.  Consider [facility] referral





Psychiatric Findings





- Problem List (Axis 1, 2,3)


(1) Intellectual disability


Current Visit: Yes   Status: Acute   





(2) Anemia


Current Visit: Yes   Status: Chronic   


Qualifiers: 


   Iron deficiency anemia type: unspecified iron deficiency 





(3) Benzodiazepine dependence


Current Visit: Yes   Status: Chronic   





(4) Cannabis dependence


Current Visit: Yes   Status: Chronic   





(5) Cocaine dependence, uncomplicated


Current Visit: Yes   Status: Chronic   





(6) Nicotine dependence


Current Visit: Yes   Status: Chronic   


Qualifiers: 


   Nicotine product type: cigarettes   Substance use status: uncomplicated   

Qualified Code(s): F17.210 - Nicotine dependence, cigarettes, uncomplicated   





(7) Opioid dependence


Current Visit: Yes   Status: Chronic   


Qualifiers: 


   Substance use status: uncomplicated   Qualified Code(s): F11.20 - Opioid 

dependence, uncomplicated   





(8) Alcohol dependence with uncomplicated withdrawal


Current Visit: No   Status: Acute   





(9) Opioid dependence with withdrawal


Current Visit: No   Status: Acute   





(10) Weight loss


Current Visit: No   Status: Acute   





(11) Drug-induced mood disorder


Current Visit: No   Status: Suspected   Comment: Suspected.   





- Initial Treatment Plan


Initial Treatment Plan: Haldol 1mg po q4 prn for anxiety and agitation.  

Obsevration.  Rehab Unit Care.  Consider [facility] referral

## 2018-10-29 NOTE — PN
[facility] Progress Note


Note: 





NURSE REPORTS PT HAS LESIONS AND DRY FLAKY SCALP. PT IS VERY AGITATED AND 

RESTLESS AND UNABLE TO TALK TO PROVIDER HX OF THE LESIONS BUT ONLY INDICATED 

"DON'T YOU KNOW IT'S DRUGS?".





EXAM:MULTIPLE DEPIGMENTED HEALED AREAS ON HEAD AND UPPER EXTREMITIES WITH A 

SCABBED LESION ON MIDDLE OF SCALP.


 Vital Signs - 24 hr











  10/28/18 10/29/18 10/29/18





  20:50 00:30 03:30


 


Pulse Rate 85  


 


Respiratory  18 18





Rate   


 


Blood Pressure 101/64  














  10/29/18 10/29/18





  09:37 11:51


 


Pulse Rate 97 H 97 H


 


Respiratory 18 18





Rate  


 


Blood Pressure 128/80 128/80








HEAD LESIONS





PLAN:BACITRACIN OINTMENT APPLY AS DIRECTED.


SELSUN BLUE SHAMPOO TWICE A WEEK.

## 2018-10-30 LAB
ALBUMIN SERPL-MCNC: 2.9 G/DL (ref 3.4–5)
ALP SERPL-CCNC: 88 U/L (ref 45–117)
ALT SERPL-CCNC: 25 U/L (ref 13–61)
AMYLASE SERPL-CCNC: 110 U/L (ref 25–115)
ANION GAP SERPL CALC-SCNC: 5 MMOL/L (ref 8–16)
AST SERPL-CCNC: 16 U/L (ref 15–37)
BILIRUB SERPL-MCNC: 0.1 MG/DL (ref 0.2–1)
BUN SERPL-MCNC: 51 MG/DL (ref 7–18)
CALCIUM SERPL-MCNC: 8.9 MG/DL (ref 8.5–10.1)
CHLORIDE SERPL-SCNC: 104 MMOL/L (ref 98–107)
CO2 SERPL-SCNC: 27 MMOL/L (ref 21–32)
CREAT SERPL-MCNC: 2 MG/DL (ref 0.55–1.3)
DEPRECATED RDW RBC AUTO: 15.6 % (ref 11.6–15.6)
GLUCOSE SERPL-MCNC: 269 MG/DL (ref 74–106)
HCT VFR BLD CALC: 30.9 % (ref 32.4–45.2)
HGB BLD-MCNC: 9.5 GM/DL (ref 10.7–15.3)
LIPASE SERPL-CCNC: 110 U/L (ref 73–393)
MCH RBC QN AUTO: 21.6 PG (ref 25.7–33.7)
MCHC RBC AUTO-ENTMCNC: 30.7 G/DL (ref 32–36)
MCV RBC: 70.4 FL (ref 80–96)
PHOSPHATE SERPL-MCNC: 3.7 MG/DL (ref 2.5–4.9)
PLATELET # BLD AUTO: 383 K/MM3 (ref 134–434)
PMV BLD: 7.7 FL (ref 7.5–11.1)
POTASSIUM SERPLBLD-SCNC: 5.7 MMOL/L (ref 3.5–5.1)
PROT SERPL-MCNC: 7.5 G/DL (ref 6.4–8.2)
RBC # BLD AUTO: 4.39 M/MM3 (ref 3.6–5.2)
SODIUM SERPL-SCNC: 136 MMOL/L (ref 136–145)
WBC # BLD AUTO: 8.9 K/MM3 (ref 4–10)

## 2018-10-30 RX ADMIN — Medication SCH MG: at 21:20

## 2018-10-30 RX ADMIN — INSULIN ASPART SCH: 100 INJECTION, SOLUTION INTRAVENOUS; SUBCUTANEOUS at 11:45

## 2018-10-30 RX ADMIN — BACITRACIN ZINC SCH GM: 500 OINTMENT TOPICAL at 09:03

## 2018-10-30 RX ADMIN — CYCLOBENZAPRINE HYDROCHLORIDE PRN MG: 10 TABLET, FILM COATED ORAL at 21:20

## 2018-10-30 RX ADMIN — NICOTINE SCH MG: 14 PATCH, EXTENDED RELEASE TRANSDERMAL at 09:03

## 2018-10-30 RX ADMIN — RANITIDINE SCH MG: 150 TABLET ORAL at 10:55

## 2018-10-30 RX ADMIN — BACITRACIN ZINC SCH GM: 500 OINTMENT TOPICAL at 21:20

## 2018-10-30 RX ADMIN — INSULIN ASPART SCH: 100 INJECTION, SOLUTION INTRAVENOUS; SUBCUTANEOUS at 16:54

## 2018-10-30 RX ADMIN — ALUMINUM HYDROXIDE, MAGNESIUM HYDROXIDE, AND SIMETHICONE PRN ML: 200; 200; 20 SUSPENSION ORAL at 04:12

## 2018-10-30 RX ADMIN — ALUMINUM HYDROXIDE, MAGNESIUM HYDROXIDE, AND SIMETHICONE PRN ML: 200; 200; 20 SUSPENSION ORAL at 19:52

## 2018-10-30 RX ADMIN — CYCLOBENZAPRINE HYDROCHLORIDE PRN MG: 10 TABLET, FILM COATED ORAL at 13:29

## 2018-10-30 RX ADMIN — INSULIN ASPART SCH: 100 INJECTION, SOLUTION INTRAVENOUS; SUBCUTANEOUS at 07:27

## 2018-10-30 RX ADMIN — RANITIDINE SCH MG: 150 TABLET ORAL at 21:20

## 2018-10-30 RX ADMIN — Medication PRN MG: at 21:22

## 2018-10-30 RX ADMIN — Medication SCH: at 09:04

## 2018-10-30 RX ADMIN — ALUMINUM HYDROXIDE, MAGNESIUM HYDROXIDE, AND SIMETHICONE PRN ML: 200; 200; 20 SUSPENSION ORAL at 11:36

## 2018-10-30 NOTE — PN
[facility] Progress Note


Note: 





PT IS ALERT O X 3 AND CALMER TODAY. THIS WRITER AND NURSE SANTA EXPLAINED TO 

PT SHE WILL NEED  REPEAT OF HER LABS(SEE ORIGINAL LABS IN DETOX) THIS AFTERNOON 

INSTEAD OF EARLY IN THE MORNING AND PT AGREES NOW. APPARENTLY, PT HAD REFUSED X 

3 AS REPORTED BY STAFF.


 Vital Signs











  10/30/18 10/30/18 10/30/18





  06:30 07:11 10:41


 


Temperature  98.9 F 


 


Pulse Rate  90 82


 


Respiratory 17 18 18





Rate   


 


Blood Pressure  118/82 138/82








NAD


PLAN;REPEAT LABS AS DIRECTED.

## 2018-10-31 RX ADMIN — BACITRACIN ZINC SCH GM: 500 OINTMENT TOPICAL at 09:05

## 2018-10-31 RX ADMIN — FERROUS SULFATE TAB EC 324 MG (65 MG FE EQUIVALENT) SCH MG: 324 (65 FE) TABLET DELAYED RESPONSE at 10:20

## 2018-10-31 RX ADMIN — FERROUS SULFATE TAB EC 324 MG (65 MG FE EQUIVALENT) SCH MG: 324 (65 FE) TABLET DELAYED RESPONSE at 21:17

## 2018-10-31 RX ADMIN — INSULIN ASPART SCH: 100 INJECTION, SOLUTION INTRAVENOUS; SUBCUTANEOUS at 07:33

## 2018-10-31 RX ADMIN — Medication SCH: at 09:05

## 2018-10-31 RX ADMIN — ALUMINUM HYDROXIDE, MAGNESIUM HYDROXIDE, AND SIMETHICONE PRN ML: 200; 200; 20 SUSPENSION ORAL at 03:33

## 2018-10-31 RX ADMIN — CYCLOBENZAPRINE HYDROCHLORIDE PRN MG: 10 TABLET, FILM COATED ORAL at 14:51

## 2018-10-31 RX ADMIN — INSULIN ASPART SCH: 100 INJECTION, SOLUTION INTRAVENOUS; SUBCUTANEOUS at 17:21

## 2018-10-31 RX ADMIN — CYCLOBENZAPRINE HYDROCHLORIDE PRN MG: 10 TABLET, FILM COATED ORAL at 21:17

## 2018-10-31 RX ADMIN — DOCUSATE SODIUM SCH MG: 100 CAPSULE, LIQUID FILLED ORAL at 10:20

## 2018-10-31 RX ADMIN — ALUMINUM HYDROXIDE, MAGNESIUM HYDROXIDE, AND SIMETHICONE PRN ML: 200; 200; 20 SUSPENSION ORAL at 18:14

## 2018-10-31 RX ADMIN — DOCUSATE SODIUM SCH MG: 100 CAPSULE, LIQUID FILLED ORAL at 21:17

## 2018-10-31 RX ADMIN — CYCLOBENZAPRINE HYDROCHLORIDE PRN MG: 10 TABLET, FILM COATED ORAL at 06:00

## 2018-10-31 RX ADMIN — Medication SCH MG: at 21:17

## 2018-10-31 RX ADMIN — ALUMINUM HYDROXIDE, MAGNESIUM HYDROXIDE, AND SIMETHICONE PRN ML: 200; 200; 20 SUSPENSION ORAL at 10:20

## 2018-10-31 RX ADMIN — Medication PRN MG: at 21:18

## 2018-10-31 RX ADMIN — INSULIN ASPART SCH: 100 INJECTION, SOLUTION INTRAVENOUS; SUBCUTANEOUS at 12:01

## 2018-10-31 RX ADMIN — BACITRACIN ZINC SCH GM: 500 OINTMENT TOPICAL at 21:20

## 2018-10-31 RX ADMIN — NICOTINE SCH MG: 14 PATCH, EXTENDED RELEASE TRANSDERMAL at 09:05

## 2018-10-31 RX ADMIN — RANITIDINE SCH MG: 150 TABLET ORAL at 09:05

## 2018-10-31 RX ADMIN — RANITIDINE SCH MG: 150 TABLET ORAL at 21:17

## 2018-11-01 VITALS — HEART RATE: 106 BPM | TEMPERATURE: 99 F | SYSTOLIC BLOOD PRESSURE: 131 MMHG | DIASTOLIC BLOOD PRESSURE: 83 MMHG

## 2018-11-01 RX ADMIN — INSULIN ASPART SCH: 100 INJECTION, SOLUTION INTRAVENOUS; SUBCUTANEOUS at 06:27

## 2018-11-01 RX ADMIN — ALUMINUM HYDROXIDE, MAGNESIUM HYDROXIDE, AND SIMETHICONE PRN ML: 200; 200; 20 SUSPENSION ORAL at 03:22

## 2018-11-01 RX ADMIN — CYCLOBENZAPRINE HYDROCHLORIDE PRN MG: 10 TABLET, FILM COATED ORAL at 06:17

## 2018-11-01 RX ADMIN — FERROUS SULFATE TAB EC 324 MG (65 MG FE EQUIVALENT) SCH MG: 324 (65 FE) TABLET DELAYED RESPONSE at 10:38

## 2018-11-01 RX ADMIN — BACITRACIN ZINC SCH GM: 500 OINTMENT TOPICAL at 10:39

## 2018-11-01 RX ADMIN — DOCUSATE SODIUM SCH MG: 100 CAPSULE, LIQUID FILLED ORAL at 10:38

## 2018-11-01 RX ADMIN — NICOTINE SCH MG: 14 PATCH, EXTENDED RELEASE TRANSDERMAL at 10:38

## 2018-11-01 RX ADMIN — Medication SCH TAB: at 10:38

## 2018-11-01 RX ADMIN — RANITIDINE SCH MG: 150 TABLET ORAL at 10:38

## 2018-11-01 RX ADMIN — INSULIN ASPART SCH: 100 INJECTION, SOLUTION INTRAVENOUS; SUBCUTANEOUS at 11:30

## 2018-11-01 NOTE — PN
[facility] Progress Note


Note: 





THE NURSE, GOYO INFORMED THIS WRITER THAT PT WANTS TO LEAVE AMA. CAME TO SEE 

PT WHO WAS ALREADY DRESSED WITH HER PROPERTY STANDING BY THE NURSING STATION, 

CALM AND NOT AGITATED. PT REPORTS HER PMD AS GABI PICKENS  

[facility] [address], [place] [address].  PH: [phone]. PT REFUSED FOR 

THIS PROVIDER TO CALL PMD TO SET UP APPOINTMENT FOR A FOLLOW UP RE: HER 

ABNORMAL LABS STATING "I DON'T WANT YOU TO CALL HIM". PT REFUSED TO SIGN 

CONSENT TO THAT EFFECT. PT WAS SEE SEEN BY DR. GARCIA AND COUNSELORS DIANNE BRENNAN AND BARBARA BUT INTERVENTION WAS UNSUCCESSFUL AS PATIENT INSISTS ON 

LEAVING TREATMENT TODAY.


  Vital Signs (72 hours)











  10/29/18 10/30/18 10/30/18





  22:10 00:30 03:30


 


Temperature   


 


Pulse Rate 93 H  


 


Respiratory 18 17 17





Rate   


 


Blood Pressure 101/68  














  10/30/18 10/30/18 10/30/18





  06:30 07:11 10:41


 


Temperature  98.9 F 


 


Pulse Rate  90 82


 


Respiratory 17 18 18





Rate   


 


Blood Pressure  118/82 138/82














  10/31/18 10/31/18 10/31/18





  00:30 03:30 07:04


 


Temperature   98.1 F


 


Pulse Rate   105 H


 


Respiratory 18 18 18





Rate   


 


Blood Pressure   149/100














  10/31/18 10/31/18 11/01/18





  14:50 17:48 00:30


 


Temperature   


 


Pulse Rate 108 H 112 H 


 


Respiratory   16





Rate   


 


Blood Pressure 152/95 83/50 L 














  11/01/18





  07:14


 


Temperature 99.0 F


 


Pulse Rate 106 H


 


Respiratory 16





Rate 


 


Blood Pressure 131/83








 Laboratory Tests











  10/26/18 10/27/18 10/27/18





  19:09 06:38 09:20


 


WBC   


 


RBC   


 


Hgb   


 


Hct   


 


MCV   


 


MCH   


 


MCHC   


 


RDW   


 


Plt Count   


 


MPV   


 


Sodium   


 


Potassium   


 


Chloride   


 


Carbon Dioxide   


 


Anion Gap   


 


BUN   


 


Creatinine   


 


Creat Clearance w eGFR   


 


POC Glucometer  105  114 


 


Random Glucose   


 


Calcium   


 


Phosphorus   


 


Total Bilirubin   


 


AST   


 


ALT   


 


Alkaline Phosphatase   


 


Ammonia   


 


Total Protein   


 


Albumin   


 


Total Amylase   


 


Lipase   


 


Urine Color    Ltyellow


 


Urine Appearance    Slcloudy


 


Urine pH    5.0


 


Ur Specific Gravity    1.018


 


Urine Protein    Negative


 


Urine Glucose (UA)    Negative


 


Urine Ketones    Negative


 


Urine Blood    Negative


 


Urine Nitrite    Negative


 


Urine Bilirubin    Negative


 


Urine Urobilinogen    Negative


 


Ur Leukocyte Esterase    3+ H


 


Urine WBC (Auto)    46


 


Urine RBC (Auto)    5


 


Ur Epithelial Cells    Few


 


Urine Mucus    Rare


 


RPR Titer   


 


HIV 1&2 Antibody Screen   


 


HIV P24 Antigen   














  10/27/18 10/27/18 10/28/18





  12:08 17:15 02:51


 


WBC   


 


RBC   


 


Hgb   


 


Hct   


 


MCV   


 


MCH   


 


MCHC   


 


RDW   


 


Plt Count   


 


MPV   


 


Sodium   


 


Potassium   


 


Chloride   


 


Carbon Dioxide   


 


Anion Gap   


 


BUN   


 


Creatinine   


 


Creat Clearance w eGFR   


 


POC Glucometer  141  167  116


 


Random Glucose   


 


Calcium   


 


Phosphorus   


 


Total Bilirubin   


 


AST   


 


ALT   


 


Alkaline Phosphatase   


 


Ammonia   


 


Total Protein   


 


Albumin   


 


Total Amylase   


 


Lipase   


 


Urine Color   


 


Urine Appearance   


 


Urine pH   


 


Ur Specific Gravity   


 


Urine Protein   


 


Urine Glucose (UA)   


 


Urine Ketones   


 


Urine Blood   


 


Urine Nitrite   


 


Urine Bilirubin   


 


Urine Urobilinogen   


 


Ur Leukocyte Esterase   


 


Urine WBC (Auto)   


 


Urine RBC (Auto)   


 


Ur Epithelial Cells   


 


Urine Mucus   


 


RPR Titer   


 


HIV 1&2 Antibody Screen   


 


HIV P24 Antigen   














  10/28/18 10/29/18 10/29/18





  17:21 11:37 17:11


 


WBC   


 


RBC   


 


Hgb   


 


Hct   


 


MCV   


 


MCH   


 


MCHC   


 


RDW   


 


Plt Count   


 


MPV   


 


Sodium   


 


Potassium   


 


Chloride   


 


Carbon Dioxide   


 


Anion Gap   


 


BUN   


 


Creatinine   


 


Creat Clearance w eGFR   


 


POC Glucometer  113  167  169


 


Random Glucose   


 


Calcium   


 


Phosphorus   


 


Total Bilirubin   


 


AST   


 


ALT   


 


Alkaline Phosphatase   


 


Ammonia   


 


Total Protein   


 


Albumin   


 


Total Amylase   


 


Lipase   


 


Urine Color   


 


Urine Appearance   


 


Urine pH   


 


Ur Specific Gravity   


 


Urine Protein   


 


Urine Glucose (UA)   


 


Urine Ketones   


 


Urine Blood   


 


Urine Nitrite   


 


Urine Bilirubin   


 


Urine Urobilinogen   


 


Ur Leukocyte Esterase   


 


Urine WBC (Auto)   


 


Urine RBC (Auto)   


 


Ur Epithelial Cells   


 


Urine Mucus   


 


RPR Titer   


 


HIV 1&2 Antibody Screen   


 


HIV P24 Antigen   














  10/30/18 10/30/18 10/30/18





  06:08 12:30 12:30


 


WBC   8.9 


 


RBC   4.39 


 


Hgb   9.5 L 


 


Hct   30.9 L 


 


MCV   70.4 L 


 


MCH   21.6 L 


 


MCHC   30.7 L 


 


RDW   15.6 


 


Plt Count   383  D 


 


MPV   7.7 


 


Sodium    136


 


Potassium    5.7 H


 


Chloride    104


 


Carbon Dioxide    27


 


Anion Gap    5 L


 


BUN    51 H


 


Creatinine    2.0 H


 


Creat Clearance w eGFR    27.74


 


POC Glucometer  229  


 


Random Glucose    269 H


 


Calcium    8.9


 


Phosphorus    3.7


 


Total Bilirubin    0.1 L


 


AST    16


 


ALT    25


 


Alkaline Phosphatase    88


 


Ammonia   


 


Total Protein    7.5


 


Albumin    2.9 L


 


Total Amylase    110


 


Lipase    110


 


Urine Color   


 


Urine Appearance   


 


Urine pH   


 


Ur Specific Gravity   


 


Urine Protein   


 


Urine Glucose (UA)   


 


Urine Ketones   


 


Urine Blood   


 


Urine Nitrite   


 


Urine Bilirubin   


 


Urine Urobilinogen   


 


Ur Leukocyte Esterase   


 


Urine WBC (Auto)   


 


Urine RBC (Auto)   


 


Ur Epithelial Cells   


 


Urine Mucus   


 


RPR Titer   


 


HIV 1&2 Antibody Screen   


 


HIV P24 Antigen   














  10/30/18 10/30/18 10/30/18





  12:30 13:20 15:40


 


WBC   


 


RBC   


 


Hgb   


 


Hct   


 


MCV   


 


MCH   


 


MCHC   


 


RDW   


 


Plt Count   


 


MPV   


 


Sodium   


 


Potassium   


 


Chloride   


 


Carbon Dioxide   


 


Anion Gap   


 


BUN   


 


Creatinine   


 


Creat Clearance w eGFR   


 


POC Glucometer   


 


Random Glucose   


 


Calcium   


 


Phosphorus   


 


Total Bilirubin   


 


AST   


 


ALT   


 


Alkaline Phosphatase   


 


Ammonia  26.01  


 


Total Protein   


 


Albumin   


 


Total Amylase   


 


Lipase   


 


Urine Color   


 


Urine Appearance   


 


Urine pH   


 


Ur Specific Gravity   


 


Urine Protein   


 


Urine Glucose (UA)   


 


Urine Ketones   


 


Urine Blood   


 


Urine Nitrite   


 


Urine Bilirubin   


 


Urine Urobilinogen   


 


Ur Leukocyte Esterase   


 


Urine WBC (Auto)   


 


Urine RBC (Auto)   


 


Ur Epithelial Cells   


 


Urine Mucus   


 


RPR Titer    Nonreactive


 


HIV 1&2 Antibody Screen   Negative 


 


HIV P24 Antigen   Negative 














  10/31/18 10/31/18 11/01/18





  11:56 17:19 06:15


 


WBC   


 


RBC   


 


Hgb   


 


Hct   


 


MCV   


 


MCH   


 


MCHC   


 


RDW   


 


Plt Count   


 


MPV   


 


Sodium   


 


Potassium   


 


Chloride   


 


Carbon Dioxide   


 


Anion Gap   


 


BUN   


 


Creatinine   


 


Creat Clearance w eGFR   


 


POC Glucometer  199  118  222


 


Random Glucose   


 


Calcium   


 


Phosphorus   


 


Total Bilirubin   


 


AST   


 


ALT   


 


Alkaline Phosphatase   


 


Ammonia   


 


Total Protein   


 


Albumin   


 


Total Amylase   


 


Lipase   


 


Urine Color   


 


Urine Appearance   


 


Urine pH   


 


Ur Specific Gravity   


 


Urine Protein   


 


Urine Glucose (UA)   


 


Urine Ketones   


 


Urine Blood   


 


Urine Nitrite   


 


Urine Bilirubin   


 


Urine Urobilinogen   


 


Ur Leukocyte Esterase   


 


Urine WBC (Auto)   


 


Urine RBC (Auto)   


 


Ur Epithelial Cells   


 


Urine Mucus   


 


RPR Titer   


 


HIV 1&2 Antibody Screen   


 


HIV P24 Antigen   














  11/01/18





  11:18


 


WBC 


 


RBC 


 


Hgb 


 


Hct 


 


MCV 


 


MCH 


 


MCHC 


 


RDW 


 


Plt Count 


 


MPV 


 


Sodium 


 


Potassium 


 


Chloride 


 


Carbon Dioxide 


 


Anion Gap 


 


BUN 


 


Creatinine 


 


Creat Clearance w eGFR 


 


POC Glucometer  257


 


Random Glucose 


 


Calcium 


 


Phosphorus 


 


Total Bilirubin 


 


AST 


 


ALT 


 


Alkaline Phosphatase 


 


Ammonia 


 


Total Protein 


 


Albumin 


 


Total Amylase 


 


Lipase 


 


Urine Color 


 


Urine Appearance 


 


Urine pH 


 


Ur Specific Gravity 


 


Urine Protein 


 


Urine Glucose (UA) 


 


Urine Ketones 


 


Urine Blood 


 


Urine Nitrite 


 


Urine Bilirubin 


 


Urine Urobilinogen 


 


Ur Leukocyte Esterase 


 


Urine WBC (Auto) 


 


Urine RBC (Auto) 


 


Ur Epithelial Cells 


 


Urine Mucus 


 


RPR Titer 


 


HIV 1&2 Antibody Screen 


 


HIV P24 Antigen 




















NAD





PT SIGNED OUT AMA.


PT WAS GIVEN A COPY OF LABS AS ABOVE TO FOLLOW UP WITH HIS PMD AND INSTRUCTED 

TO TAKE IT TO HER PMD WITHIN ONE WEEK OF LEAVING HERE. PT WAS ALSO TOLD TO SEEK 

MEDICAL CARE AT AN ER NEAR HER IN CASE OF AN EMERGENCY WHEN NEEDED.